# Patient Record
Sex: FEMALE | Race: WHITE | NOT HISPANIC OR LATINO | URBAN - METROPOLITAN AREA
[De-identification: names, ages, dates, MRNs, and addresses within clinical notes are randomized per-mention and may not be internally consistent; named-entity substitution may affect disease eponyms.]

---

## 2024-05-01 ENCOUNTER — HOSPITAL ENCOUNTER (INPATIENT)
Facility: HOSPITAL | Age: 69
LOS: 1 days | Discharge: HOME | DRG: 419 | End: 2024-05-03
Attending: EMERGENCY MEDICINE | Admitting: COLON & RECTAL SURGERY
Payer: MEDICARE

## 2024-05-01 DIAGNOSIS — R79.89 ELEVATED LIVER FUNCTION TESTS: ICD-10-CM

## 2024-05-01 DIAGNOSIS — K80.00 ACUTE CHOLECYSTITIS DUE TO BILIARY CALCULUS: Primary | ICD-10-CM

## 2024-05-01 DIAGNOSIS — K80.01 CALCULUS OF GALLBLADDER WITH ACUTE CHOLECYSTITIS AND OBSTRUCTION: ICD-10-CM

## 2024-05-01 LAB
ALBUMIN SERPL BCP-MCNC: 2.9 G/DL (ref 3.5–5.2)
ALP SERPL-CCNC: 326 U/L (ref 55–135)
ALT SERPL W/O P-5'-P-CCNC: 99 U/L (ref 10–44)
ANION GAP SERPL CALC-SCNC: 9 MMOL/L (ref 8–16)
AST SERPL-CCNC: 186 U/L (ref 10–40)
BASOPHILS # BLD AUTO: 0.05 K/UL (ref 0–0.2)
BASOPHILS NFR BLD: 0.5 % (ref 0–1.9)
BILIRUB SERPL-MCNC: 1.4 MG/DL (ref 0.1–1)
BUN SERPL-MCNC: 11 MG/DL (ref 8–23)
CALCIUM SERPL-MCNC: 9.1 MG/DL (ref 8.7–10.5)
CHLORIDE SERPL-SCNC: 101 MMOL/L (ref 95–110)
CO2 SERPL-SCNC: 23 MMOL/L (ref 23–29)
CREAT SERPL-MCNC: 0.7 MG/DL (ref 0.5–1.4)
DIFFERENTIAL METHOD BLD: ABNORMAL
EOSINOPHIL # BLD AUTO: 0.1 K/UL (ref 0–0.5)
EOSINOPHIL NFR BLD: 1 % (ref 0–8)
ERYTHROCYTE [DISTWIDTH] IN BLOOD BY AUTOMATED COUNT: 12.8 % (ref 11.5–14.5)
EST. GFR  (NO RACE VARIABLE): >60 ML/MIN/1.73 M^2
GLUCOSE SERPL-MCNC: 148 MG/DL (ref 70–110)
HCT VFR BLD AUTO: 40.7 % (ref 37–48.5)
HGB BLD-MCNC: 13.5 G/DL (ref 12–16)
IMM GRANULOCYTES # BLD AUTO: 0.07 K/UL (ref 0–0.04)
IMM GRANULOCYTES NFR BLD AUTO: 0.7 % (ref 0–0.5)
LIPASE SERPL-CCNC: 10 U/L (ref 4–60)
LYMPHOCYTES # BLD AUTO: 0.9 K/UL (ref 1–4.8)
LYMPHOCYTES NFR BLD: 8 % (ref 18–48)
MCH RBC QN AUTO: 30 PG (ref 27–31)
MCHC RBC AUTO-ENTMCNC: 33.2 G/DL (ref 32–36)
MCV RBC AUTO: 90 FL (ref 82–98)
MONOCYTES # BLD AUTO: 0.9 K/UL (ref 0.3–1)
MONOCYTES NFR BLD: 8.9 % (ref 4–15)
NEUTROPHILS # BLD AUTO: 8.6 K/UL (ref 1.8–7.7)
NEUTROPHILS NFR BLD: 80.9 % (ref 38–73)
NRBC BLD-RTO: 0 /100 WBC
PLATELET # BLD AUTO: 277 K/UL (ref 150–450)
PMV BLD AUTO: 9.1 FL (ref 9.2–12.9)
POTASSIUM SERPL-SCNC: 3.8 MMOL/L (ref 3.5–5.1)
PROT SERPL-MCNC: 6.6 G/DL (ref 6–8.4)
RBC # BLD AUTO: 4.5 M/UL (ref 4–5.4)
SODIUM SERPL-SCNC: 133 MMOL/L (ref 136–145)
WBC # BLD AUTO: 10.57 K/UL (ref 3.9–12.7)

## 2024-05-01 PROCEDURE — 96376 TX/PRO/DX INJ SAME DRUG ADON: CPT

## 2024-05-01 PROCEDURE — 85025 COMPLETE CBC W/AUTO DIFF WBC: CPT | Performed by: EMERGENCY MEDICINE

## 2024-05-01 PROCEDURE — 99285 EMERGENCY DEPT VISIT HI MDM: CPT | Mod: 25

## 2024-05-01 PROCEDURE — 36415 COLL VENOUS BLD VENIPUNCTURE: CPT | Performed by: EMERGENCY MEDICINE

## 2024-05-01 PROCEDURE — 96375 TX/PRO/DX INJ NEW DRUG ADDON: CPT

## 2024-05-01 PROCEDURE — 83690 ASSAY OF LIPASE: CPT | Performed by: EMERGENCY MEDICINE

## 2024-05-01 PROCEDURE — 63600175 PHARM REV CODE 636 W HCPCS: Performed by: EMERGENCY MEDICINE

## 2024-05-01 PROCEDURE — 80053 COMPREHEN METABOLIC PANEL: CPT | Performed by: EMERGENCY MEDICINE

## 2024-05-01 PROCEDURE — 96366 THER/PROPH/DIAG IV INF ADDON: CPT

## 2024-05-01 PROCEDURE — 25000003 PHARM REV CODE 250: Performed by: EMERGENCY MEDICINE

## 2024-05-01 PROCEDURE — 96365 THER/PROPH/DIAG IV INF INIT: CPT

## 2024-05-01 RX ORDER — SODIUM CHLORIDE, SODIUM LACTATE, POTASSIUM CHLORIDE, CALCIUM CHLORIDE 600; 310; 30; 20 MG/100ML; MG/100ML; MG/100ML; MG/100ML
INJECTION, SOLUTION INTRAVENOUS CONTINUOUS
Status: DISCONTINUED | OUTPATIENT
Start: 2024-05-02 | End: 2024-05-02

## 2024-05-01 RX ORDER — ACETAMINOPHEN 325 MG/1
650 TABLET ORAL EVERY 8 HOURS PRN
Status: DISCONTINUED | OUTPATIENT
Start: 2024-05-02 | End: 2024-05-03 | Stop reason: HOSPADM

## 2024-05-01 RX ORDER — ONDANSETRON HYDROCHLORIDE 2 MG/ML
4 INJECTION, SOLUTION INTRAVENOUS EVERY 8 HOURS PRN
Status: DISCONTINUED | OUTPATIENT
Start: 2024-05-02 | End: 2024-05-02

## 2024-05-01 RX ORDER — TALC
6 POWDER (GRAM) TOPICAL NIGHTLY PRN
Status: DISCONTINUED | OUTPATIENT
Start: 2024-05-02 | End: 2024-05-03 | Stop reason: HOSPADM

## 2024-05-01 RX ORDER — MORPHINE SULFATE 4 MG/ML
6 INJECTION, SOLUTION INTRAMUSCULAR; INTRAVENOUS EVERY 4 HOURS PRN
Status: DISCONTINUED | OUTPATIENT
Start: 2024-05-02 | End: 2024-05-02

## 2024-05-01 RX ORDER — MORPHINE SULFATE 4 MG/ML
6 INJECTION, SOLUTION INTRAMUSCULAR; INTRAVENOUS
Status: COMPLETED | OUTPATIENT
Start: 2024-05-01 | End: 2024-05-01

## 2024-05-01 RX ORDER — ONDANSETRON HYDROCHLORIDE 2 MG/ML
4 INJECTION, SOLUTION INTRAVENOUS
Status: COMPLETED | OUTPATIENT
Start: 2024-05-01 | End: 2024-05-01

## 2024-05-01 RX ORDER — MORPHINE SULFATE 4 MG/ML
4 INJECTION, SOLUTION INTRAMUSCULAR; INTRAVENOUS
Status: COMPLETED | OUTPATIENT
Start: 2024-05-01 | End: 2024-05-01

## 2024-05-01 RX ORDER — SODIUM CHLORIDE 0.9 % (FLUSH) 0.9 %
10 SYRINGE (ML) INJECTION
Status: DISCONTINUED | OUTPATIENT
Start: 2024-05-02 | End: 2024-05-03 | Stop reason: HOSPADM

## 2024-05-01 RX ORDER — SODIUM CHLORIDE, SODIUM LACTATE, POTASSIUM CHLORIDE, CALCIUM CHLORIDE 600; 310; 30; 20 MG/100ML; MG/100ML; MG/100ML; MG/100ML
1000 INJECTION, SOLUTION INTRAVENOUS
Status: COMPLETED | OUTPATIENT
Start: 2024-05-01 | End: 2024-05-01

## 2024-05-01 RX ORDER — LIDOCAINE HYDROCHLORIDE 10 MG/ML
1 INJECTION, SOLUTION EPIDURAL; INFILTRATION; INTRACAUDAL; PERINEURAL ONCE AS NEEDED
Status: DISCONTINUED | OUTPATIENT
Start: 2024-05-02 | End: 2024-05-03 | Stop reason: HOSPADM

## 2024-05-01 RX ADMIN — ONDANSETRON 4 MG: 2 INJECTION INTRAMUSCULAR; INTRAVENOUS at 06:05

## 2024-05-01 RX ADMIN — SODIUM CHLORIDE, SODIUM LACTATE, POTASSIUM CHLORIDE, AND CALCIUM CHLORIDE 1000 ML: 600; 310; 30; 20 INJECTION, SOLUTION INTRAVENOUS at 06:05

## 2024-05-01 RX ADMIN — PIPERACILLIN AND TAZOBACTAM 4.5 G: 4; .5 INJECTION, POWDER, LYOPHILIZED, FOR SOLUTION INTRAVENOUS; PARENTERAL at 08:05

## 2024-05-01 RX ADMIN — MORPHINE SULFATE 6 MG: 4 INJECTION INTRAVENOUS at 10:05

## 2024-05-01 RX ADMIN — MORPHINE SULFATE 4 MG: 4 INJECTION INTRAVENOUS at 06:05

## 2024-05-01 NOTE — ED PROVIDER NOTES
Endocrinology Note         Awilda is a 33 year old female presents today for Hashimoto's thyroiditis    HPI  Awilda is a 33 year old female presents today for Hashimoto's thyroiditis.    She was diagnosed with Hashimoto thyroiditis during her first pregnancy about 4 years ago.  She was initiated on levothyroxine during pregnancy and discontinued after the first pregnancy.  In 2017 before her second pregnancy, she was tested positive for TPO and antithyroglobulin antibody and TSH was 11.  She was then started on thyroid medication.  She stated she was started on both levothyroxine and liothyronine.  She does not know why she was started on both medication at the same time.    She moved to [place] in June 2018 and gave birth to her second child in January 2019.  She stated over the past 6 months she has been feeling more fatigue, sleeps a lot about 8 to 9 hours a day, abdominal bloating, inability to lose weight and brain fog.  She usually feels cold.she had regular menstrual period.  She stated she gained about 30 pounds since her first pregnancy and unable to lose.    Her baseline weight before pregnancy was 150 pound.  She stated her work is pretty active.  She exercise using elliptical about 1-3 times per week and yoga once a week.  Her diet is decent with a lot of fruits and some vegetable.  She usually cook at home.    Her current regimen include levothyroxine 88 mcg and liothyronine 5 mcg daily.    Past Medical History  Past Medical History:   Diagnosis Date     Asthma      Hashimoto's disease      Lichen sclerosus 11/2019     Uncomplicated asthma     Uses an inhaler as needed       Allergies  Allergies   Allergen Reactions     No Known Allergies      Medications  Current Outpatient Medications   Medication Sig Dispense Refill     Cholecalciferol (VITAMIN D-3 PO)        clobetasol (TEMOVATE) 0.05 % external cream Apply topically three times a week 32 g 3     levothyroxine (TIROSINT) 88 MCG capsule Take  SCRIBE #1 NOTE: I, Micheal Lopez, am scribing for, and in the presence of, Risa Hunter MD. I have scribed the HPI, ROS, and PE.     SCRIBE #2 NOTE: I, Goran Gilmore, am scribing for, and in the presence of,  Sirisha Andres MD. I have scribed the remaining portions of the note not scribed by Scribe #1.      History     Chief Complaint   Patient presents with    Abdominal Pain     RUQ abd pain radiating to the back. Hx gallstones. +n/v states this it the third episode since November      Review of patient's allergies indicates:   Allergen Reactions    Statins-hmg-coa reductase inhibitors Other (See Comments)     Severe muscular issues    Evolocumab     Calcium Itching    Codeine Nausea And Vomiting    Fish oil Rash         History of Present Illness     HPI    5/1/2024, 6:31 PM  History obtained from the patient      History of Present Illness: Apple Gonsalez is a 68 y.o. female patient with a PMHx of chronic constipation, mixed HLD, and osteopenia who presents to the Emergency Department for evaluation of progressively worsening RUQ pain which onset on Saturday. Pt notes that she has a hx of gallstones and that this feels similar to when she was hospitalized in November 2023. The pain radiates to her back and worsens after eating. Associated sxs include N/V. Patient denies any CP, SOB, HA, fever, cough, and all other sxs at this time. No prior Tx. She has not f/u with general surgery and is currently in the process of moving to [place] from [place]. No further complaints or concerns at this time.       Arrival mode: Personal vehicle      PCP: Ken Wall MD        Past Medical History:  Past Medical History:   Diagnosis Date    Chronic constipation 7/10/2020    Mixed hyperlipidemia 7/10/2020    Osteopenia of multiple sites 7/10/2020       Past Surgical History:  Past Surgical History:   Procedure Laterality Date    APPENDECTOMY      HYSTERECTOMY      TONSILLECTOMY           Family  "88 mcg by mouth daily 90 capsule 3     liothyronine (CYTOMEL) 5 MCG tablet Take 1 tablet (5 mcg) by mouth daily 90 tablet 3     Family History  family history includes Arthritis in her father and paternal grandmother; Asthma in her brother; Breast Cancer (age of onset: 40) in her mother; Diabetes in her maternal grandfather; Eye Disorder in her maternal grandmother; Gastrointestinal Disease in an other family member; Heart Disease in her paternal grandfather; Hyperlipidemia in her mother; Hypertension in her father; Osteoporosis in her paternal grandmother.   Her father may have thyroid problem but is not on medication  No thyroid cancer in the family    Social History  Social History     Tobacco Use     Smoking status: Never Smoker     Smokeless tobacco: Never Used   Substance Use Topics     Alcohol use: Yes     Comment: very infrequently     Drug use: No   Drink alcohol once a week  No smoking  , has 2 children aged 4 and 1 years old    ROS  Constitutional: no weight change, good energy  Eyes: no vision change, diplopia or red eyes   Neck: no difficulty swallowing, no choking, no neck pain, no neck swelling  Cardiovascular: no chest pain, palpitations  Respiratory: no dyspnea, cough, shortness of breath or wheezing   GI: no nausea, vomiting, diarrhea or constipation, no abdominal pain   : no change in urine, no dysuria or hematuria  Musculoskeletal: no joint or muscle pain or swelling   Integumentary: no concerning lesions or moles   Neuro: no loss of strength or sensation, no numbness or tingling, no tremor, no dizziness, no headache   Endo: no polyuria or polydipsia, no temperature intolerance   Heme/Lymph: no concerning bumps, no bleeding problems   Allergy: no environmental allergies   Psych: no depression or anxiety, no sleep problems.    Physical Exam  /74   Pulse 90   Ht 1.651 m (5' 5\")   Wt 81 kg (178 lb 8 oz)   LMP 12/21/2019   SpO2 98%   BMI 29.70 kg/m    Body mass index is 29.7 " History:  Family History   Problem Relation Name Age of Onset    Diabetes Father      Hypertension Father      Hyperlipidemia Maternal Grandfather         Social History:  Social History     Tobacco Use    Smoking status: Never    Smokeless tobacco: Never   Substance and Sexual Activity    Alcohol use: Yes     Comment: occasional    Drug use: Not on file    Sexual activity: Not on file        Review of Systems     Review of Systems   Constitutional:  Negative for fever.   HENT:  Negative for sore throat.    Respiratory:  Negative for cough and shortness of breath.    Cardiovascular:  Negative for chest pain.   Gastrointestinal:  Positive for abdominal pain (RUQ), nausea and vomiting.   Genitourinary:  Negative for dysuria.   Musculoskeletal:  Negative for back pain.   Skin:  Negative for rash.   Neurological:  Negative for weakness and headaches.   Hematological:  Does not bruise/bleed easily.   All other systems reviewed and are negative.     Physical Exam     Initial Vitals [05/01/24 1702]   BP Pulse Resp Temp SpO2   118/61 103 18 98.7 °F (37.1 °C) 99 %      MAP       --          Physical Exam  Nursing Notes and Vital Signs Reviewed.  Constitutional: Patient is in no acute distress. Well-developed and well-nourished.  Head: Atraumatic. Normocephalic.  Eyes: PERRL. EOM intact. Conjunctivae are not pale. No scleral icterus.  ENT: Mucous membranes are moist. Oropharynx is clear and symmetric.    Neck: Supple. Full ROM. No lymphadenopathy.  Cardiovascular: Regular rate. Regular rhythm. No murmurs, rubs, or gallops. Distal pulses are 2+ and symmetric.  Pulmonary/Chest: No respiratory distress. Clear to auscultation bilaterally. No wheezing or rales.  Abdominal: Soft and non-distended.  RUQ tenderness.(+) Carrera's sign.  Genitourinary: No CVA tenderness  Musculoskeletal: Moves all extremities. No obvious deformities. No edema. No calf tenderness.  Skin: Warm and dry.  Neurological:  Alert, awake, and appropriate.   "Normal speech.  No acute focal neurological deficits are appreciated.  Psychiatric: Normal affect. Good eye contact. Appropriate in content.     ED Course   Procedures  ED Vital Signs:  Vitals:    05/01/24 1702 05/01/24 1842 05/01/24 1845 05/01/24 2132   BP: 118/61 (!) 119/44  (!) 123/56   Pulse: 103 93  92   Resp: 18  16    Temp: 98.7 °F (37.1 °C)      TempSrc: Oral      SpO2: 99% 97%  96%   Weight: 62.5 kg (137 lb 12.6 oz)      Height:        05/01/24 2200 05/01/24 2202 05/02/24 0006 05/02/24 0103   BP:  (!) 115/57 (!) 101/50 104/60   Pulse:  90 87 76   Resp: 17  14 16   Temp:   99.8 °F (37.7 °C) 97.4 °F (36.3 °C)   TempSrc:   Axillary Oral   SpO2:  96% 96%    Weight:    61.2 kg (135 lb)   Height:    5' 3" (1.6 m)    05/02/24 0105 05/02/24 0107 05/02/24 0115   BP: 104/60 104/60 104/60   Pulse: 75 75    Resp: 16 16    Temp: 97.4 °F (36.3 °C) 97.4 °F (36.3 °C) 97.4 °F (36.3 °C)   TempSrc:  Oral    SpO2: 96% 96%    Weight: 61.2 kg (135 lb)     Height: 5' 3" (1.6 m)         Abnormal Lab Results:  Labs Reviewed   CBC W/ AUTO DIFFERENTIAL - Abnormal; Notable for the following components:       Result Value    MPV 9.1 (*)     Immature Granulocytes 0.7 (*)     Gran # (ANC) 8.6 (*)     Immature Grans (Abs) 0.07 (*)     Lymph # 0.9 (*)     Gran % 80.9 (*)     Lymph % 8.0 (*)     All other components within normal limits   COMPREHENSIVE METABOLIC PANEL - Abnormal; Notable for the following components:    Sodium 133 (*)     Glucose 148 (*)     Albumin 2.9 (*)     Total Bilirubin 1.4 (*)     Alkaline Phosphatase 326 (*)      (*)     ALT 99 (*)     All other components within normal limits    Narrative:     Collection has been rescheduled by ROR1 at 05/01/2024 21:20 Reason:   Unable to collect   LIPASE    Narrative:     Collection has been rescheduled by ROR1 at 05/01/2024 21:20 Reason:   Unable to collect        All Lab Results:  Results for orders placed or performed during the hospital encounter of 05/01/24   CBC auto " kg/m .  Constitutional: no distress, comfortable, pleasant   Eyes: anicteric, normal extra-ocular movements, no lid lag or retraction  Neck: no thyromegaly, no discrete nodule  Cardiovascular: regular rate and rhythm, normal S1 and S2, no murmurs  Respiratory: clear to auscultation, no wheezes or crackles, normal breath sounds   Gastrointestinal:  nontender, no hepatosplenomegaly, no masses   Musculoskeletal: no edema   Skin: no concerning lesions, no jaundice   Neurological: cranial nerves intact, normal strength and sensation, reflexes at patella, normal gait, no tremor on outstretched hands bilaterally  Psychological: appropriate mood   Lymphatic: no cervical  lymphadenopathy.      RESULTS  I have personally reviewed labs and images. I also reviewed labs with patient and discussed the result and plan of care.          ASSESSMENT:    Awilda is a 33 year old female presents today for Hashimoto's thyroiditis.    1) Hashimoto thyroiditis: Diagnosed 4 years ago.  She had positive TPO and antithyroglobulin antibody.  Her symptoms got worse over the past 6 months after she gave birth to her second child.  Her symptoms include abdominal bloating, fatigue, excessive sleepiness, and weight gain.  I am not quite sure whether her symptoms are related to thyroid disease or not.  I discussed with her on using only levothyroxine as a treatment of Hashimoto thyroiditis.  She is okay with this approach.  We will start with rechecking TSH, free T4, and total T3 to adjust dose accordingly.    2) abdominal bloating: This is a concerning.  No other gastrointestinal symptoms.  I will check tissue transglutaminase antibody to rule out celiac disease.  I advised her to try gluten-free diet.    PLAN:   -Check TSH, free T4, total T3 and tissue transglutaminase antibody today  -I will adjust dose accordingly    Addendum  Results for BIJAL HOLGUIN (MRN [id number]) as of 1/2/2020 16:55   Ref. Range 1/2/2020 08:53   T4 Free Latest Ref Range:  0.76 - 1.46 ng/dL 0.85   Triiodothyronine (T3) Latest Ref Range: 60 - 181 ng/dL 120   TSH Latest Ref Range: 0.40 - 4.00 mU/L 1.88     Will discontinue liothyronine  Will increase levothyroxine 100 mcg daily.  Repeat lab in 6-8 weeks    Moose Spears MD  Division of Diabetes and Endocrinology  Department of Medicine  [phone]     differential   Result Value Ref Range    WBC 10.57 3.90 - 12.70 K/uL    RBC 4.50 4.00 - 5.40 M/uL    Hemoglobin 13.5 12.0 - 16.0 g/dL    Hematocrit 40.7 37.0 - 48.5 %    MCV 90 82 - 98 fL    MCH 30.0 27.0 - 31.0 pg    MCHC 33.2 32.0 - 36.0 g/dL    RDW 12.8 11.5 - 14.5 %    Platelets 277 150 - 450 K/uL    MPV 9.1 (L) 9.2 - 12.9 fL    Immature Granulocytes 0.7 (H) 0.0 - 0.5 %    Gran # (ANC) 8.6 (H) 1.8 - 7.7 K/uL    Immature Grans (Abs) 0.07 (H) 0.00 - 0.04 K/uL    Lymph # 0.9 (L) 1.0 - 4.8 K/uL    Mono # 0.9 0.3 - 1.0 K/uL    Eos # 0.1 0.0 - 0.5 K/uL    Baso # 0.05 0.00 - 0.20 K/uL    nRBC 0 0 /100 WBC    Gran % 80.9 (H) 38.0 - 73.0 %    Lymph % 8.0 (L) 18.0 - 48.0 %    Mono % 8.9 4.0 - 15.0 %    Eosinophil % 1.0 0.0 - 8.0 %    Basophil % 0.5 0.0 - 1.9 %    Differential Method Automated    Comprehensive metabolic panel   Result Value Ref Range    Sodium 133 (L) 136 - 145 mmol/L    Potassium 3.8 3.5 - 5.1 mmol/L    Chloride 101 95 - 110 mmol/L    CO2 23 23 - 29 mmol/L    Glucose 148 (H) 70 - 110 mg/dL    BUN 11 8 - 23 mg/dL    Creatinine 0.7 0.5 - 1.4 mg/dL    Calcium 9.1 8.7 - 10.5 mg/dL    Total Protein 6.6 6.0 - 8.4 g/dL    Albumin 2.9 (L) 3.5 - 5.2 g/dL    Total Bilirubin 1.4 (H) 0.1 - 1.0 mg/dL    Alkaline Phosphatase 326 (H) 55 - 135 U/L     (H) 10 - 40 U/L    ALT 99 (H) 10 - 44 U/L    eGFR >60 >60 mL/min/1.73 m^2    Anion Gap 9 8 - 16 mmol/L   Lipase   Result Value Ref Range    Lipase 10 4 - 60 U/L        Imaging Results:  Imaging Results              US Abdomen Limited (Final result)  Result time 05/01/24 20:17:44      Final result by Barrett Lu MD (05/01/24 20:17:44)                   Impression:      Hemangiomas previously reported on prior exam not seen likely due to limited windows with midline bowel gas.    Thickened gallbladder wall measuring up to 4.3 mm.  Distended gallbladder. Small shadowing stone in the gallbladder neck. Positive sonographic Carrera's sign.  No common bile duct  dilatation.  A a correlate clinically for cholecystitis.      Electronically signed by: Barrett Aly  Date:    05/01/2024  Time:    20:17               Narrative:    EXAMINATION:  US ABDOMEN LIMITED    CLINICAL HISTORY:  RUQ Pain;    TECHNIQUE:  Limited ultrasound of the right upper quadrant of the abdomen (including pancreas, liver, gallbladder, common bile duct, and spleen) was performed.    COMPARISON:  Prior report    FINDINGS:  Liver: Hemangiomas previously reported on prior exam not seen likely due to limited windows with midline bowel gas.  Liver measures up to 15.2 cm.    Gallbladder: Thickened gallbladder wall measuring up to 4.3 mm.  Distended gallbladder.  Small shadowing stone in the gallbladder neck.  Positive sonographic Carrera's sign.    Biliary system: The common duct is not dilated, measuring 5 mm.  No intrahepatic ductal dilatation.    Right kidney: Normal in size and echotexture, measuring 9.1 cm.    Miscellaneous: No upper abdominal ascites.  Pancreas is obscured by bowel gas                                         The Emergency Provider reviewed the vital signs and test results, which are outlined above.     ED Discussion     8:00 PM: Dr. Hunter transfers care of patient to Dr. Andres pending imagin results.         Medical Decision Making  Amount and/or Complexity of Data Reviewed  External Data Reviewed: notes.     Details: Note from November 2023 reviewed. Shows that pt presented with similar sxs but also had a small bowel obstruction. Her sxs were treated, and she was discharged with instructions to f/u with general surgery to have an elective cholecystectomy.   Labs: ordered. Decision-making details documented in ED Course.  Radiology: ordered. Decision-making details documented in ED Course.  Discussion of management or test interpretation with external provider(s):     11:59 PM: Discussed case with Dr. Valdez (General Surgery) who reocmmends obs, NPO, IVF, pain and nausea  medication, and repeat CBC and CMP at 0600. Dr. Valdez agrees with current care and management of pt and accepts admission.   Admitting Service: General Surgery  Admitting Physician: Dr. Valdez  Admit to: Obs    11:59 PM: Re-evaluated pt. I have discussed test results, shared treatment plan, and the need for admission with patient and family at bedside. Pt and family express understanding at this time and agree with all information. All questions answered. Pt and family have no further questions or concerns at this time. Pt is ready for admit.      Risk  OTC drugs.  Prescription drug management.                ED Medication(s):  Medications   LIDOcaine (PF) 10 mg/ml (1%) injection 10 mg (has no administration in time range)   melatonin tablet 6 mg (has no administration in time range)   acetaminophen tablet 650 mg (has no administration in time range)   sodium chloride 0.9% flush 10 mL (has no administration in time range)   lactated ringers infusion ( Intravenous New Bag 5/2/24 0112)   morphine injection 6 mg (has no administration in time range)   ondansetron injection 4 mg (4 mg Intravenous Given 5/2/24 0045)   piperacillin-tazobactam (ZOSYN) 4.5 g in dextrose 5 % in water (D5W) 100 mL IVPB (MB+) (has no administration in time range)   ondansetron injection 4 mg (4 mg Intravenous Given 5/1/24 1845)   morphine injection 4 mg (4 mg Intravenous Given 5/1/24 1845)   lactated ringers infusion (1,000 mLs Intravenous New Bag 5/1/24 1845)   piperacillin-tazobactam (ZOSYN) 4.5 g in dextrose 5 % in water (D5W) 100 mL IVPB (MB+) (0 g Intravenous Stopped 5/1/24 2212)   morphine injection 6 mg (6 mg Intravenous Given 5/1/24 2200)       Current Discharge Medication List                  Scribe Attestation:   Scribe #1: I performed the above scribed service and the documentation accurately describes the services I performed. I attest to the accuracy of the note.     Attending:   Physician Attestation Statement for Scribe  #1: IDale Ashlyn, MD, personally performed the services described in this documentation, as scribed by Micheal Lopez, in my presence, and it is both accurate and complete.       Scribe Attestation:   Scribe #2: I performed the above scribed service and the documentation accurately describes the services I performed. I attest to the accuracy of the note.    Attending Attestation:           Physician Attestation for Scribe:    Physician Attestation Statement for Scribe #2: I, Sirisha Andres MD, reviewed documentation, as scribed by Goran Gilmore in my presence, and it is both accurate and complete. I also acknowledge and confirm the content of the note done by Scribe #1.           Clinical Impression       ICD-10-CM ICD-9-CM   1. Acute cholecystitis due to biliary calculus  K80.00 574.00       Disposition:   Disposition: Placed in Observation  Condition: Sirisha Hardy MD  05/02/24 2389

## 2024-05-02 ENCOUNTER — ANESTHESIA (OUTPATIENT)
Dept: SURGERY | Facility: HOSPITAL | Age: 69
DRG: 419 | End: 2024-05-02
Payer: MEDICARE

## 2024-05-02 ENCOUNTER — ANESTHESIA EVENT (OUTPATIENT)
Dept: SURGERY | Facility: HOSPITAL | Age: 69
DRG: 419 | End: 2024-05-02
Payer: MEDICARE

## 2024-05-02 PROBLEM — K80.01 CALCULUS OF GALLBLADDER WITH ACUTE CHOLECYSTITIS AND OBSTRUCTION: Status: ACTIVE | Noted: 2024-05-02

## 2024-05-02 PROBLEM — R79.89 ELEVATED LIVER FUNCTION TESTS: Status: ACTIVE | Noted: 2024-05-02

## 2024-05-02 LAB
ALBUMIN SERPL BCP-MCNC: 2.8 G/DL (ref 3.5–5.2)
ALP SERPL-CCNC: 334 U/L (ref 55–135)
ALT SERPL W/O P-5'-P-CCNC: 158 U/L (ref 10–44)
ANION GAP SERPL CALC-SCNC: 10 MMOL/L (ref 8–16)
AST SERPL-CCNC: 216 U/L (ref 10–40)
BASOPHILS # BLD AUTO: 0.03 K/UL (ref 0–0.2)
BASOPHILS NFR BLD: 0.3 % (ref 0–1.9)
BILIRUB SERPL-MCNC: 1.2 MG/DL (ref 0.1–1)
BUN SERPL-MCNC: 12 MG/DL (ref 8–23)
CALCIUM SERPL-MCNC: 8.9 MG/DL (ref 8.7–10.5)
CHLORIDE SERPL-SCNC: 102 MMOL/L (ref 95–110)
CO2 SERPL-SCNC: 23 MMOL/L (ref 23–29)
CREAT SERPL-MCNC: 0.7 MG/DL (ref 0.5–1.4)
DIFFERENTIAL METHOD BLD: ABNORMAL
EOSINOPHIL # BLD AUTO: 0 K/UL (ref 0–0.5)
EOSINOPHIL NFR BLD: 0 % (ref 0–8)
ERYTHROCYTE [DISTWIDTH] IN BLOOD BY AUTOMATED COUNT: 12.9 % (ref 11.5–14.5)
EST. GFR  (NO RACE VARIABLE): >60 ML/MIN/1.73 M^2
GLUCOSE SERPL-MCNC: 136 MG/DL (ref 70–110)
HCT VFR BLD AUTO: 33.7 % (ref 37–48.5)
HGB BLD-MCNC: 11.1 G/DL (ref 12–16)
IMM GRANULOCYTES # BLD AUTO: 0.06 K/UL (ref 0–0.04)
IMM GRANULOCYTES NFR BLD AUTO: 0.6 % (ref 0–0.5)
LYMPHOCYTES # BLD AUTO: 0.6 K/UL (ref 1–4.8)
LYMPHOCYTES NFR BLD: 6.1 % (ref 18–48)
MCH RBC QN AUTO: 29.8 PG (ref 27–31)
MCHC RBC AUTO-ENTMCNC: 32.9 G/DL (ref 32–36)
MCV RBC AUTO: 90 FL (ref 82–98)
MONOCYTES # BLD AUTO: 1.1 K/UL (ref 0.3–1)
MONOCYTES NFR BLD: 11.5 % (ref 4–15)
NEUTROPHILS # BLD AUTO: 8 K/UL (ref 1.8–7.7)
NEUTROPHILS NFR BLD: 81.5 % (ref 38–73)
NRBC BLD-RTO: 0 /100 WBC
PLATELET # BLD AUTO: 241 K/UL (ref 150–450)
PMV BLD AUTO: 9.4 FL (ref 9.2–12.9)
POTASSIUM SERPL-SCNC: 4.3 MMOL/L (ref 3.5–5.1)
PROT SERPL-MCNC: 5.8 G/DL (ref 6–8.4)
RBC # BLD AUTO: 3.73 M/UL (ref 4–5.4)
SODIUM SERPL-SCNC: 135 MMOL/L (ref 136–145)
WBC # BLD AUTO: 9.79 K/UL (ref 3.9–12.7)

## 2024-05-02 PROCEDURE — 47562 LAPAROSCOPIC CHOLECYSTECTOMY: CPT | Mod: 22,,, | Performed by: COLON & RECTAL SURGERY

## 2024-05-02 PROCEDURE — 27201423 OPTIME MED/SURG SUP & DEVICES STERILE SUPPLY: Performed by: COLON & RECTAL SURGERY

## 2024-05-02 PROCEDURE — 25000003 PHARM REV CODE 250: Performed by: NURSE ANESTHETIST, CERTIFIED REGISTERED

## 2024-05-02 PROCEDURE — 63600175 PHARM REV CODE 636 W HCPCS: Mod: JZ,JG | Performed by: ANESTHESIOLOGY

## 2024-05-02 PROCEDURE — 88304 TISSUE EXAM BY PATHOLOGIST: CPT | Performed by: PATHOLOGY

## 2024-05-02 PROCEDURE — 96366 THER/PROPH/DIAG IV INF ADDON: CPT

## 2024-05-02 PROCEDURE — 63600175 PHARM REV CODE 636 W HCPCS: Performed by: NURSE ANESTHETIST, CERTIFIED REGISTERED

## 2024-05-02 PROCEDURE — 8E0W4CZ ROBOTIC ASSISTED PROCEDURE OF TRUNK REGION, PERCUTANEOUS ENDOSCOPIC APPROACH: ICD-10-PCS | Performed by: COLON & RECTAL SURGERY

## 2024-05-02 PROCEDURE — 0FT44ZZ RESECTION OF GALLBLADDER, PERCUTANEOUS ENDOSCOPIC APPROACH: ICD-10-PCS | Performed by: COLON & RECTAL SURGERY

## 2024-05-02 PROCEDURE — 63600175 PHARM REV CODE 636 W HCPCS

## 2024-05-02 PROCEDURE — 71000033 HC RECOVERY, INTIAL HOUR: Performed by: COLON & RECTAL SURGERY

## 2024-05-02 PROCEDURE — 88304 TISSUE EXAM BY PATHOLOGIST: CPT | Mod: 26,,, | Performed by: PATHOLOGY

## 2024-05-02 PROCEDURE — 11000001 HC ACUTE MED/SURG PRIVATE ROOM

## 2024-05-02 PROCEDURE — 63600175 PHARM REV CODE 636 W HCPCS: Performed by: STUDENT IN AN ORGANIZED HEALTH CARE EDUCATION/TRAINING PROGRAM

## 2024-05-02 PROCEDURE — 85025 COMPLETE CBC W/AUTO DIFF WBC: CPT | Performed by: EMERGENCY MEDICINE

## 2024-05-02 PROCEDURE — 47562 LAPAROSCOPIC CHOLECYSTECTOMY: CPT | Mod: AS,,,

## 2024-05-02 PROCEDURE — 25000003 PHARM REV CODE 250: Performed by: EMERGENCY MEDICINE

## 2024-05-02 PROCEDURE — 37000008 HC ANESTHESIA 1ST 15 MINUTES: Performed by: COLON & RECTAL SURGERY

## 2024-05-02 PROCEDURE — 99223 1ST HOSP IP/OBS HIGH 75: CPT | Mod: AI,,, | Performed by: SURGERY

## 2024-05-02 PROCEDURE — 63600175 PHARM REV CODE 636 W HCPCS: Performed by: EMERGENCY MEDICINE

## 2024-05-02 PROCEDURE — 36000710: Performed by: COLON & RECTAL SURGERY

## 2024-05-02 PROCEDURE — 36415 COLL VENOUS BLD VENIPUNCTURE: CPT | Performed by: EMERGENCY MEDICINE

## 2024-05-02 PROCEDURE — 96361 HYDRATE IV INFUSION ADD-ON: CPT

## 2024-05-02 PROCEDURE — 99900035 HC TECH TIME PER 15 MIN (STAT)

## 2024-05-02 PROCEDURE — 71000039 HC RECOVERY, EACH ADD'L HOUR: Performed by: COLON & RECTAL SURGERY

## 2024-05-02 PROCEDURE — 80053 COMPREHEN METABOLIC PANEL: CPT | Performed by: EMERGENCY MEDICINE

## 2024-05-02 PROCEDURE — 36000711: Performed by: COLON & RECTAL SURGERY

## 2024-05-02 PROCEDURE — 37000009 HC ANESTHESIA EA ADD 15 MINS: Performed by: COLON & RECTAL SURGERY

## 2024-05-02 PROCEDURE — 25000003 PHARM REV CODE 250

## 2024-05-02 PROCEDURE — 63600175 PHARM REV CODE 636 W HCPCS: Mod: JZ,JG | Performed by: COLON & RECTAL SURGERY

## 2024-05-02 PROCEDURE — 96376 TX/PRO/DX INJ SAME DRUG ADON: CPT

## 2024-05-02 RX ORDER — HYDROCODONE BITARTRATE AND ACETAMINOPHEN 5; 325 MG/1; MG/1
1 TABLET ORAL EVERY 4 HOURS PRN
Status: DISCONTINUED | OUTPATIENT
Start: 2024-05-02 | End: 2024-05-02

## 2024-05-02 RX ORDER — MIDAZOLAM HYDROCHLORIDE 1 MG/ML
INJECTION INTRAMUSCULAR; INTRAVENOUS
Status: DISCONTINUED | OUTPATIENT
Start: 2024-05-02 | End: 2024-05-02

## 2024-05-02 RX ORDER — SUCCINYLCHOLINE CHLORIDE 20 MG/ML
INJECTION INTRAMUSCULAR; INTRAVENOUS
Status: DISCONTINUED | OUTPATIENT
Start: 2024-05-02 | End: 2024-05-02

## 2024-05-02 RX ORDER — FENTANYL CITRATE 50 UG/ML
INJECTION, SOLUTION INTRAMUSCULAR; INTRAVENOUS
Status: DISCONTINUED | OUTPATIENT
Start: 2024-05-02 | End: 2024-05-02

## 2024-05-02 RX ORDER — MORPHINE SULFATE 4 MG/ML
6 INJECTION, SOLUTION INTRAMUSCULAR; INTRAVENOUS EVERY 4 HOURS PRN
Status: DISCONTINUED | OUTPATIENT
Start: 2024-05-02 | End: 2024-05-02

## 2024-05-02 RX ORDER — OXYCODONE AND ACETAMINOPHEN 5; 325 MG/1; MG/1
1 TABLET ORAL
Status: DISCONTINUED | OUTPATIENT
Start: 2024-05-02 | End: 2024-05-02 | Stop reason: HOSPADM

## 2024-05-02 RX ORDER — ROCURONIUM BROMIDE 10 MG/ML
INJECTION, SOLUTION INTRAVENOUS
Status: DISCONTINUED | OUTPATIENT
Start: 2024-05-02 | End: 2024-05-02

## 2024-05-02 RX ORDER — ONDANSETRON HYDROCHLORIDE 2 MG/ML
INJECTION, SOLUTION INTRAVENOUS
Status: DISCONTINUED | OUTPATIENT
Start: 2024-05-02 | End: 2024-05-02

## 2024-05-02 RX ORDER — PROPOFOL 10 MG/ML
VIAL (ML) INTRAVENOUS
Status: DISCONTINUED | OUTPATIENT
Start: 2024-05-02 | End: 2024-05-02

## 2024-05-02 RX ORDER — ACETAMINOPHEN 10 MG/ML
1000 INJECTION, SOLUTION INTRAVENOUS ONCE
Status: COMPLETED | OUTPATIENT
Start: 2024-05-02 | End: 2024-05-02

## 2024-05-02 RX ORDER — PHENYLEPHRINE HYDROCHLORIDE 10 MG/ML
INJECTION INTRAVENOUS
Status: DISCONTINUED | OUTPATIENT
Start: 2024-05-02 | End: 2024-05-02

## 2024-05-02 RX ORDER — ACETAMINOPHEN 325 MG/1
650 TABLET ORAL EVERY 6 HOURS
Status: DISCONTINUED | OUTPATIENT
Start: 2024-05-02 | End: 2024-05-03 | Stop reason: HOSPADM

## 2024-05-02 RX ORDER — LIDOCAINE HYDROCHLORIDE 20 MG/ML
INJECTION INTRAVENOUS
Status: DISCONTINUED | OUTPATIENT
Start: 2024-05-02 | End: 2024-05-02

## 2024-05-02 RX ORDER — CHLORHEXIDINE GLUCONATE ORAL RINSE 1.2 MG/ML
10 SOLUTION DENTAL 2 TIMES DAILY
Status: DISCONTINUED | OUTPATIENT
Start: 2024-05-02 | End: 2024-05-03 | Stop reason: HOSPADM

## 2024-05-02 RX ORDER — ONDANSETRON HYDROCHLORIDE 2 MG/ML
4 INJECTION, SOLUTION INTRAVENOUS EVERY 6 HOURS PRN
Status: DISCONTINUED | OUTPATIENT
Start: 2024-05-02 | End: 2024-05-03 | Stop reason: HOSPADM

## 2024-05-02 RX ORDER — OXYCODONE HYDROCHLORIDE 5 MG/1
10 TABLET ORAL EVERY 4 HOURS PRN
Status: DISCONTINUED | OUTPATIENT
Start: 2024-05-02 | End: 2024-05-03 | Stop reason: HOSPADM

## 2024-05-02 RX ORDER — INDOCYANINE GREEN AND WATER 25 MG
KIT INJECTION
Status: DISCONTINUED | OUTPATIENT
Start: 2024-05-02 | End: 2024-05-02

## 2024-05-02 RX ORDER — SODIUM CHLORIDE, SODIUM LACTATE, POTASSIUM CHLORIDE, CALCIUM CHLORIDE 600; 310; 30; 20 MG/100ML; MG/100ML; MG/100ML; MG/100ML
INJECTION, SOLUTION INTRAVENOUS CONTINUOUS
Status: DISCONTINUED | OUTPATIENT
Start: 2024-05-02 | End: 2024-05-03 | Stop reason: HOSPADM

## 2024-05-02 RX ORDER — DEXAMETHASONE SODIUM PHOSPHATE 4 MG/ML
INJECTION, SOLUTION INTRA-ARTICULAR; INTRALESIONAL; INTRAMUSCULAR; INTRAVENOUS; SOFT TISSUE
Status: DISCONTINUED | OUTPATIENT
Start: 2024-05-02 | End: 2024-05-02

## 2024-05-02 RX ORDER — DIPHENHYDRAMINE HYDROCHLORIDE 50 MG/ML
12.5 INJECTION INTRAMUSCULAR; INTRAVENOUS EVERY 6 HOURS PRN
Status: DISCONTINUED | OUTPATIENT
Start: 2024-05-02 | End: 2024-05-03 | Stop reason: HOSPADM

## 2024-05-02 RX ORDER — BUPIVACAINE HYDROCHLORIDE 2.5 MG/ML
INJECTION, SOLUTION EPIDURAL; INFILTRATION; INTRACAUDAL
Status: DISCONTINUED | OUTPATIENT
Start: 2024-05-02 | End: 2024-05-02 | Stop reason: HOSPADM

## 2024-05-02 RX ORDER — OXYCODONE HYDROCHLORIDE 5 MG/1
5 TABLET ORAL EVERY 4 HOURS PRN
Status: DISCONTINUED | OUTPATIENT
Start: 2024-05-02 | End: 2024-05-03 | Stop reason: HOSPADM

## 2024-05-02 RX ORDER — HYDROCODONE BITARTRATE AND ACETAMINOPHEN 7.5; 325 MG/1; MG/1
1 TABLET ORAL EVERY 6 HOURS PRN
Status: DISCONTINUED | OUTPATIENT
Start: 2024-05-02 | End: 2024-05-02

## 2024-05-02 RX ORDER — HYDROMORPHONE HYDROCHLORIDE 2 MG/ML
0.2 INJECTION, SOLUTION INTRAMUSCULAR; INTRAVENOUS; SUBCUTANEOUS EVERY 5 MIN PRN
Status: DISCONTINUED | OUTPATIENT
Start: 2024-05-02 | End: 2024-05-02 | Stop reason: HOSPADM

## 2024-05-02 RX ORDER — ONDANSETRON HYDROCHLORIDE 2 MG/ML
4 INJECTION, SOLUTION INTRAVENOUS DAILY PRN
Status: DISCONTINUED | OUTPATIENT
Start: 2024-05-02 | End: 2024-05-02 | Stop reason: HOSPADM

## 2024-05-02 RX ADMIN — SODIUM CHLORIDE, POTASSIUM CHLORIDE, SODIUM LACTATE AND CALCIUM CHLORIDE: 600; 310; 30; 20 INJECTION, SOLUTION INTRAVENOUS at 01:05

## 2024-05-02 RX ADMIN — ROCURONIUM BROMIDE 10 MG: 10 INJECTION, SOLUTION INTRAVENOUS at 11:05

## 2024-05-02 RX ADMIN — CHLORHEXIDINE GLUCONATE 0.12% ORAL RINSE 10 ML: 1.2 LIQUID ORAL at 08:05

## 2024-05-02 RX ADMIN — DEXAMETHASONE SODIUM PHOSPHATE 8 MG: 4 INJECTION, SOLUTION INTRA-ARTICULAR; INTRALESIONAL; INTRAMUSCULAR; INTRAVENOUS; SOFT TISSUE at 09:05

## 2024-05-02 RX ADMIN — ROCURONIUM BROMIDE 35 MG: 10 INJECTION, SOLUTION INTRAVENOUS at 09:05

## 2024-05-02 RX ADMIN — HYDROMORPHONE HYDROCHLORIDE 0.2 MG: 2 INJECTION, SOLUTION INTRAMUSCULAR; INTRAVENOUS; SUBCUTANEOUS at 12:05

## 2024-05-02 RX ADMIN — PIPERACILLIN AND TAZOBACTAM 4.5 G: 4; .5 INJECTION, POWDER, LYOPHILIZED, FOR SOLUTION INTRAVENOUS; PARENTERAL at 05:05

## 2024-05-02 RX ADMIN — ONDANSETRON 4 MG: 2 INJECTION INTRAMUSCULAR; INTRAVENOUS at 04:05

## 2024-05-02 RX ADMIN — MIDAZOLAM HYDROCHLORIDE 2 MG: 1 INJECTION, SOLUTION INTRAMUSCULAR; INTRAVENOUS at 09:05

## 2024-05-02 RX ADMIN — SODIUM CHLORIDE, SODIUM LACTATE, POTASSIUM CHLORIDE, AND CALCIUM CHLORIDE: .6; .31; .03; .02 INJECTION, SOLUTION INTRAVENOUS at 09:05

## 2024-05-02 RX ADMIN — FENTANYL CITRATE 50 MCG: 50 INJECTION, SOLUTION INTRAMUSCULAR; INTRAVENOUS at 09:05

## 2024-05-02 RX ADMIN — PROPOFOL 140 MG: 10 INJECTION, EMULSION INTRAVENOUS at 09:05

## 2024-05-02 RX ADMIN — ROCURONIUM BROMIDE 5 MG: 10 INJECTION, SOLUTION INTRAVENOUS at 09:05

## 2024-05-02 RX ADMIN — INDOCYANINE GREEN AND WATER 2.5 MG: KIT at 09:05

## 2024-05-02 RX ADMIN — PIPERACILLIN AND TAZOBACTAM 4.5 G: 4; .5 INJECTION, POWDER, LYOPHILIZED, FOR SOLUTION INTRAVENOUS; PARENTERAL at 10:05

## 2024-05-02 RX ADMIN — SODIUM CHLORIDE, SODIUM LACTATE, POTASSIUM CHLORIDE, AND CALCIUM CHLORIDE: .6; .31; .03; .02 INJECTION, SOLUTION INTRAVENOUS at 11:05

## 2024-05-02 RX ADMIN — OXYCODONE 10 MG: 5 TABLET ORAL at 04:05

## 2024-05-02 RX ADMIN — ACETAMINOPHEN 1000 MG: 10 INJECTION INTRAVENOUS at 12:05

## 2024-05-02 RX ADMIN — ROCURONIUM BROMIDE 10 MG: 10 INJECTION, SOLUTION INTRAVENOUS at 09:05

## 2024-05-02 RX ADMIN — ONDANSETRON 4 MG: 2 INJECTION INTRAMUSCULAR; INTRAVENOUS at 10:05

## 2024-05-02 RX ADMIN — SUCCINYLCHOLINE CHLORIDE 100 MG: 20 INJECTION, SOLUTION INTRAMUSCULAR; INTRAVENOUS; PARENTERAL at 09:05

## 2024-05-02 RX ADMIN — LIDOCAINE HYDROCHLORIDE 50 MG: 20 INJECTION INTRAVENOUS at 09:05

## 2024-05-02 RX ADMIN — FENTANYL CITRATE 25 MCG: 50 INJECTION, SOLUTION INTRAMUSCULAR; INTRAVENOUS at 10:05

## 2024-05-02 RX ADMIN — ONDANSETRON 4 MG: 2 INJECTION INTRAMUSCULAR; INTRAVENOUS at 12:05

## 2024-05-02 RX ADMIN — PHENYLEPHRINE HYDROCHLORIDE 200 MCG: 10 INJECTION INTRAVENOUS at 11:05

## 2024-05-02 RX ADMIN — SODIUM CHLORIDE, POTASSIUM CHLORIDE, SODIUM LACTATE AND CALCIUM CHLORIDE: 600; 310; 30; 20 INJECTION, SOLUTION INTRAVENOUS at 08:05

## 2024-05-02 RX ADMIN — ROCURONIUM BROMIDE 10 MG: 10 INJECTION, SOLUTION INTRAVENOUS at 10:05

## 2024-05-02 RX ADMIN — ONDANSETRON 8 MG: 2 INJECTION INTRAMUSCULAR; INTRAVENOUS at 09:05

## 2024-05-02 RX ADMIN — SODIUM CHLORIDE, POTASSIUM CHLORIDE, SODIUM LACTATE AND CALCIUM CHLORIDE: 600; 310; 30; 20 INJECTION, SOLUTION INTRAVENOUS at 05:05

## 2024-05-02 RX ADMIN — FENTANYL CITRATE 25 MCG: 50 INJECTION, SOLUTION INTRAMUSCULAR; INTRAVENOUS at 11:05

## 2024-05-02 RX ADMIN — SUGAMMADEX 200 MG: 100 INJECTION, SOLUTION INTRAVENOUS at 11:05

## 2024-05-02 NOTE — TRANSFER OF CARE
"Anesthesia Transfer of Care Note    Patient: Aplpe King    Procedure(s) Performed: Procedure(s) (LRB):  XI ROBOTIC CHOLECYSTECTOMY (N/A)  BLOCK, TRANSVERSUS ABDOMINIS PLANE (N/A)    Patient location: PACU    Anesthesia Type: general    Transport from OR: Transported from OR on room air with adequate spontaneous ventilation    Post pain: adequate analgesia    Post assessment: no apparent anesthetic complications    Post vital signs: stable    Level of consciousness: responds to stimulation    Nausea/Vomiting: no nausea/vomiting    Complications: none    Transfer of care protocol was followed      Last vitals: Visit Vitals  BP (!) 105/56 (Patient Position: Lying)   Pulse 74   Temp 36.7 °C (98 °F) (Oral)   Resp 18   Ht 5' 3" (1.6 m)   Wt 61.2 kg (135 lb)   SpO2 96%   Breastfeeding No   BMI 23.91 kg/m²     "

## 2024-05-02 NOTE — ANESTHESIA PREPROCEDURE EVALUATION
05/02/2024  Apple Gonsalez is a 68 y.o., female    Patient Active Problem List   Diagnosis    Osteopenia of multiple sites    Mixed hyperlipidemia    Chronic constipation    Calculus of gallbladder with acute cholecystitis and obstruction    Elevated liver function tests     Past Medical History:   Diagnosis Date    Chronic constipation 7/10/2020    Mixed hyperlipidemia 7/10/2020    Osteopenia of multiple sites 7/10/2020     Past Surgical History:   Procedure Laterality Date    APPENDECTOMY      HYSTERECTOMY      TONSILLECTOMY         Chemistry        Component Value Date/Time     (L) 05/02/2024 0505    K 4.3 05/02/2024 0505     05/02/2024 0505    CO2 23 05/02/2024 0505    BUN 12 05/02/2024 0505    CREATININE 0.7 05/02/2024 0505     (H) 05/02/2024 0505        Component Value Date/Time    CALCIUM 8.9 05/02/2024 0505    ALKPHOS 334 (H) 05/02/2024 0505     (H) 05/02/2024 0505     (H) 05/02/2024 0505    BILITOT 1.2 (H) 05/02/2024 0505        Lab Results   Component Value Date    WBC 9.79 05/02/2024    HGB 11.1 (L) 05/02/2024    HCT 33.7 (L) 05/02/2024    MCV 90 05/02/2024     05/02/2024       Pre-op Assessment    I have reviewed the Patient Summary Reports.    I have reviewed the NPO Status.   I have reviewed the Medications.     Review of Systems  Anesthesia Hx:  No problems with previous Anesthesia  Zofran works well for her PONV History of prior surgery of interest to airway management or planning:  Previous anesthesia: General         Personal Hx of Anesthesia complications, Post-Operative Nausea/Vomiting, in the past, but not with recent anesthetics / prophylaxis                    Social:  Non-Smoker       Hematology/Oncology:    Oncology Normal    -- Anemia:               Hematology Comments: 11.1/33.7                    Cardiovascular:  Cardiovascular Normal                                             Pulmonary:  Pulmonary Normal                       Renal/:  Renal/ Normal                 Hepatic/GI:        Elevated LFTs    Calculus of Gallbladder           Musculoskeletal:  Musculoskeletal Normal                Neurological:  Neurology Normal                                      Endocrine:  Endocrine Normal    BMI 24      Denies Obesity / BMI > 30      Physical Exam  General: Well nourished, Cooperative, Alert and Oriented    Airway:  Mallampati: III   Mouth Opening: Normal  TM Distance: Normal  Tongue: Normal  Neck ROM: Normal ROM    Dental:  Intact  Patient denies any currently loose or chipped teeth; Patient denies any removable dental appliances        Anesthesia Plan  Type of Anesthesia, risks & benefits discussed:    Anesthesia Type: Gen ETT  Intra-op Monitoring Plan: Standard ASA Monitors  Post Op Pain Control Plan: multimodal analgesia and IV/PO Opioids PRN  Induction:  IV  Airway Plan: Direct, Post-Induction  Informed Consent: Informed consent signed with the Patient and all parties understand the risks and agree with anesthesia plan.  All questions answered.   ASA Score: 2  Day of Surgery Review of History & Physical: H&P Update referred to the surgeon/provider.    Ready For Surgery From Anesthesia Perspective.     .

## 2024-05-02 NOTE — OP NOTE
[facility] Surg  Surgery Department  Operative Note    SUMMARY     Date of Procedure: 5/2/2024     Procedure:  Robotic cholecystectomy (mod 22)  Transversus abdominis plane block    Surgeons and Role:     * Jordy Ramos MD - Primary    Assistant: BRIAN Moody    Pre-Operative Diagnosis: Acute cholecystitis due to biliary calculus [K80.00]    Post-Operative Diagnosis: Post-Op Diagnosis Codes:     * Acute cholecystitis due to biliary calculus [K80.00]    Anesthesia: General    Technical Procedures Used:   Robotic cholecystectomy (mod 22)  Transversus abdominis plane block    Indications for Procedure: 67yo F with concerns for acute cholecystitis who presents for definitive surgical management    Findings of the Procedure:  Significantly inflamed gallbladder with evidence of chronic cholecystitis as well adherent to the duodenum and common bile duct requiring extensive dissection    Description of the Procedure:    *modifier 22 should be used for this case due to the difficult nature of dissection from the extremely thickened gallbladder requiring a top-down dissection*    Patient was brought to the operating room placed supine on table.  General endotracheal anesthesia was then induced.  The abdomen was then prepped and draped usual sterile fashion.  A preoperative surgical time-out was performed confirming the correct patient procedure and preop medications given.  A 5 mm left upper quadrant incision was made beneath the subcostal margin and the Veress needle inserted into abdominal cavity and confirmed good position aspiration and saline drop test.  The abdomen is insufflated to pressure 15 mm of mercury.  A 5 mm trocar was inserted this defect using Optiview technique.  The camera was introduced and there was no signs of injury upon entry.  The abdomen was evaluated and found to have adhesions of the omentum to the anterior abdominal wall mostly located in the lower abdomen.  A transversus abdominis  plane block was then performed using 30 cc of 0.25% bupivacaine plain by injecting 15 cc bilaterally into the transversus abdominis plane.    A supraumbilical 8 mm trocar was then inserted under direct visualization.  This trocar was then used to sweep down the omentum to allow for additional port placements with 2 on the right and 1 on the left in usual fashion.  The gallbladder was then identified laparoscopically and found to be extremely tense and adherent to the underlying omentum, transverse colon.  Once this was peeled off safely the gallbladder could not be retracted due to the tense nature and a decompression needle was used to decompress 50 cc of bilious fluid from the gallbladder.    The robot was then docked in usual fashion.  The gallbladder was then retracted cephalad above the liver to expose the infundibulum.  The infundibulum was found to be extremely adherent to the cystic duct as well as the common duct and the duodenum.  Careful dissection was then undertaken to attempt to identify the cystic duct and cystic artery.  However given the difficult nature of dissection in this area, decision was made to peel the gallbladder off of the gallbladder fossa 1st to safely identify the structures.  The peritoneum was then incised along the liver edge on both sides of the gallbladder and a thickened rind was left on the posterior wall of the gallbladder fossa.  The gallbladder was then dissected off using blunt and sharp dissection without injury to underlying structures.  During this process of retracting the gallbladder, a small rent was made in the gallbladder a small amount of stones and bile was spilled but was immediately suctioned.    Once the gallbladder was fully dissected off of its attachments to the gallbladder fossa, the infundibulum and the neck of the gallbladder identified and the cystic duct was then circumferentially dissected as well as the cystic artery.  The critical view of safety was  then obtained in usual fashion.  IC green had been given by anesthesia and it was confirmed that the area of the common duct was away from the area of dissection using firefly technology.  The critical view of safety was then re-obtained.  The cystic duct was extremely thickened with a large rind around it as well.  The cystic artery was then taken between large clips using electrocautery.  Two clips were placed away from the gallbladder and 1 towards the gallbladder.  Once that was taken was confirmed there was only 1 lumen and there was no extravasation of blood or bile.  The left most lateral port was then upsized to a 12 mm port.  The cystic duct was then divided using a robotic 45 mm green load stapler given the thickness of the duct.  Once it was fired the gallbladder and the ducts were completely free.  The final staple line was then oversewn with a 3-0 V lock suture in usual fashion.  The area was then checked and found to be completely hemostatic.    The gallbladder was then placed within Endo-Catch bag along with the spilled stones.  The area was then copiously irrigated and found to be hemostatic in all locations.  The robot was then de docked.  The left most lateral 12 mm port was then removed along with the Endo-Catch bag with the gallbladder and stones within it and passed off the field for final pathology.  The extraction site was then closed at the fascial level using a Job-Ruth device with 0 Vicryl suture in figure-of-eight fashion.  Once this was completed the abdomen was then checked laparoscopically and found to be hemostatic.  The abdomen was then desufflated the remaining ports were removed.  All sites were then closed with 4-0 Monocryl suture.  Skin glue was then applied.  The patient was then awoken from general endotracheal anesthesia and taken to the postanesthesia care in stable condition.  She tolerated procedure well.  All sponge, needle and instrument counts were correct at the  end of the case.    Significant Surgical Tasks Conducted by the Assistant(s), if Applicable: Assisted with all portions of the operation as it was required to help with the positioning, exposure and closure to complete the operation    Complications: No    Estimated Blood Loss (EBL): 25 mL           Implants: * No implants in log *    Specimens:   Specimen (24h ago, onward)       Start     Ordered    05/02/24 1120  Specimen to Pathology, Surgery General Surgery  Once        Comments: Pre-op Diagnosis: Acute cholecystitis due to biliary calculus [K80.00]Procedure(s):XI ROBOTIC CHOLECYSTECTOMY Number of specimens: 1Name of specimens: Gallbladder with stones (perm)     References:    Click here for ordering Quick Tip   Question Answer Comment   Procedure Type: General Surgery    Specimen Class: Routine/Screening    Release to patient Immediate        05/02/24 1120    Pending  Specimen to Pathology, Surgery General Surgery  Once        Comments: Pre-op Diagnosis: Acute cholecystitis due to biliary calculus [K80.00]Procedure(s):XI ROBOTIC CHOLECYSTECTOMY Number of specimens: 1Name of specimens: 1.  Gallbladder with stones--perm.     References:    Click here for ordering Quick Tip   Question Answer Comment   Procedure Type: General Surgery    Specimen Class: Routine/Screening    Release to patient Immediate        Pending                            Condition: Good    Disposition: PACU - hemodynamically stable.    Attestation: I performed the procedure.

## 2024-05-02 NOTE — PHARMACY MED REC
"Admission Medication History     The home medication history was taken by Suzette Ennis.    You may go to "Admission" then "Reconcile Home Medications" tabs to review and/or act upon these items.     The home medication list has been updated by the Pharmacy department.   Please read ALL comments highlighted in yellow.   Please address this information as you see fit.    Feel free to contact us if you have any questions or require assistance.      The medications listed below were removed from the home medication list. Please reorder if appropriate:  Patient reports no longer taking the following medication(s):  Asprin 81 mg  Calcium carbonate 1250 mg      Medications listed below were obtained from: Patient/family and Analytic software- [employer]      LAST [facility] COMPLETED:     Potential issues to be addressed PRIOR TO DISCHARGE  Please discuss with the patient barriers to adherence with medication treatment plans (ergocalciferol 50,000) patient has been taking this medication everyday instead of once a week    Suzettenguyen Ennis  -[phone]        Current Outpatient Medications on File Prior to Encounter   Medication Sig Dispense Refill Last Dose    ergocalciferol (ERGOCALCIFEROL) 50,000 unit Cap Take 50,000 Units by mouth every 7 days.   4/30/2024    magnesium 30 mg Tab Take 1 tablet by mouth once daily.   4/30/2024                             .          "

## 2024-05-02 NOTE — ASSESSMENT & PLAN NOTE
Patient has acute cholecystitis with elevated liver function tests.      Elevated liver function tests could be reactive or related to choledocholithiasis   Placed in observation  IV antibiotics   MRCP   Possible robotic assisted cholecystectomy by 1 of my partners.      If the MRCP shows choledocholithiasis then GI consult ERCP.  It was explained that if she needs an ERCP then she would need to go to [facility] or [facility]    The risks, benefits and complications of robotic/laparoscopic cholecystectomy were discussed.  These included infection, bleeding, abscess, retained stone and bile leak.  The need for open conversion was dicussed.  The rare possibility of a common bile duct injury and the need for additional procedures and/or surgery was reviewed.  All question were answered.  The patient has agreed to proceed.

## 2024-05-02 NOTE — SUBJECTIVE & OBJECTIVE
Current Facility-Administered Medications   Medication Dose Route Frequency Provider Last Rate Last Admin    acetaminophen tablet 650 mg  650 mg Oral Q8H PRN Sirisha Andres MD        HYDROcodone-acetaminophen 5-325 mg per tablet 1 tablet  1 tablet Oral Q4H PRN Arturo Valdez MD        HYDROcodone-acetaminophen 7.5-325 mg per tablet 1 tablet  1 tablet Oral Q6H PRN Arturo Valdez MD        lactated ringers infusion   Intravenous Continuous Arturo Valdez MD   Stopped at 05/02/24 0550    LIDOcaine (PF) 10 mg/ml (1%) injection 10 mg  1 mL Intradermal Once PRN Sirisha Andres MD        melatonin tablet 6 mg  6 mg Oral Nightly PRN Sirisha Andres MD        morphine injection 6 mg  6 mg Intravenous Q4H PRN Arturo Valdez MD        ondansetron injection 4 mg  4 mg Intravenous Q8H PRN Sirisha Andres MD   4 mg at 05/02/24 0045    piperacillin-tazobactam (ZOSYN) 4.5 g in dextrose 5 % in water (D5W) 100 mL IVPB (MB+)  4.5 g Intravenous Q8H Sirisha Andres MD 25 mL/hr at 05/02/24 0619 Rate Verify at 05/02/24 0619    sodium chloride 0.9% flush 10 mL  10 mL Intravenous PRN Sirisha Andres MD           Review of patient's allergies indicates:   Allergen Reactions    Statins-hmg-coa reductase inhibitors Other (See Comments)     Severe muscular issues    Evolocumab     Calcium Itching    Codeine Nausea And Vomiting    Fish oil Rash       Past Medical History:   Diagnosis Date    Chronic constipation 7/10/2020    Mixed hyperlipidemia 7/10/2020    Osteopenia of multiple sites 7/10/2020     Past Surgical History:   Procedure Laterality Date    APPENDECTOMY      HYSTERECTOMY      TONSILLECTOMY       Family History       Problem Relation (Age of Onset)    Diabetes Father    Hyperlipidemia Maternal Grandfather    Hypertension Father          Tobacco Use    Smoking status: Never    Smokeless tobacco: Never   Substance and Sexual Activity    Alcohol use: Yes     Comment: occasional    Drug use: Not on file    Sexual activity: Not  on file     Review of Systems   Constitutional:  Negative for appetite change, chills, fatigue, fever and unexpected weight change.   HENT:  Negative for hearing loss and rhinorrhea.    Eyes:  Negative for visual disturbance.   Respiratory:  Negative for apnea, cough, shortness of breath and wheezing.    Cardiovascular:  Negative for chest pain and palpitations.   Gastrointestinal:  Positive for abdominal pain, nausea and vomiting. Negative for abdominal distention, blood in stool, constipation and diarrhea.   Genitourinary:  Negative for dysuria, frequency and urgency.        Dark urine   Musculoskeletal:  Negative for arthralgias and neck pain.   Skin:  Negative for rash.   Neurological:  Negative for seizures, weakness, numbness and headaches.   Hematological:  Negative for adenopathy. Does not bruise/bleed easily.   Psychiatric/Behavioral:  Negative for hallucinations. The patient is not nervous/anxious.      Objective:     Vital Signs (Most Recent):  Temp: 98 °F (36.7 °C) (05/02/24 0727)  Pulse: 74 (05/02/24 0727)  Resp: 18 (05/02/24 0727)  BP: (!) 105/56 (05/02/24 0727)  SpO2: 96 % (05/02/24 0727) Vital Signs (24h Range):  Temp:  [97.4 °F (36.3 °C)-99.8 °F (37.7 °C)] 98 °F (36.7 °C)  Pulse:  [] 74  Resp:  [14-18] 18  SpO2:  [96 %-99 %] 96 %  BP: (101-123)/(44-61) 105/56     Weight: 61.2 kg (135 lb)  Body mass index is 23.91 kg/m².     Physical Exam  Constitutional:       Appearance: She is well-developed.   HENT:      Head: Normocephalic.   Eyes:      Pupils: Pupils are equal, round, and reactive to light.   Neck:      Thyroid: No thyromegaly.      Vascular: No JVD.      Trachea: No tracheal deviation.   Cardiovascular:      Rate and Rhythm: Normal rate and regular rhythm.      Heart sounds: Normal heart sounds.   Pulmonary:      Breath sounds: Normal breath sounds. No wheezing.   Abdominal:      General: Abdomen is flat. Bowel sounds are normal. There is no distension.      Palpations: Abdomen is soft.  Abdomen is not rigid. There is no mass.      Tenderness: There is no abdominal tenderness. There is no guarding or rebound.      Comments: Small supraumbilical incision.  Well-healed lower midline incision   Musculoskeletal:         General: Normal range of motion.   Lymphadenopathy:      Cervical: No cervical adenopathy.   Skin:     General: Skin is warm and dry.      Findings: No erythema or rash.   Neurological:      Mental Status: She is oriented to person, place, and time.   Psychiatric:         Mood and Affect: Mood normal.         Behavior: Behavior normal.         Thought Content: Thought content normal.         Judgment: Judgment normal.            I have reviewed all pertinent lab results within the past 24 hours.  CBC:   Recent Labs   Lab 05/02/24  0505   WBC 9.79   RBC 3.73*   HGB 11.1*   HCT 33.7*      MCV 90   MCH 29.8   MCHC 32.9     CMP:   Recent Labs   Lab 05/02/24  0505   *   CALCIUM 8.9   ALBUMIN 2.8*   PROT 5.8*   *   K 4.3   CO2 23      BUN 12   CREATININE 0.7   ALKPHOS 334*   *   *   BILITOT 1.2*       Significant Diagnostics:  I have reviewed all pertinent imaging results/findings within the past 24 hours.  U/S: I have reviewed all pertinent results/findings within the past 24 hours and my personal findings are:  Gallstones    Reading Physician Reading Date Result Priority   Barrett Lu MD  [phone] 5/1/2024 STAT     Narrative & Impression  EXAMINATION:  US ABDOMEN LIMITED     CLINICAL HISTORY:  RUQ Pain;     TECHNIQUE:  Limited ultrasound of the right upper quadrant of the abdomen (including pancreas, liver, gallbladder, common bile duct, and spleen) was performed.     COMPARISON:  Prior report     FINDINGS:  Liver: Hemangiomas previously reported on prior exam not seen likely due to limited windows with midline bowel gas.  Liver measures up to 15.2 cm.     Gallbladder: Thickened gallbladder wall measuring up to 4.3 mm.  Distended gallbladder.   Small shadowing stone in the gallbladder neck.  Positive sonographic Carrera's sign.     Biliary system: The common duct is not dilated, measuring 5 mm.  No intrahepatic ductal dilatation.     Right kidney: Normal in size and echotexture, measuring 9.1 cm.     Miscellaneous: No upper abdominal ascites.  Pancreas is obscured by bowel gas     Impression:     Hemangiomas previously reported on prior exam not seen likely due to limited windows with midline bowel gas.     Thickened gallbladder wall measuring up to 4.3 mm.  Distended gallbladder. Small shadowing stone in the gallbladder neck. Positive sonographic Carrera's sign.  No common bile duct dilatation.  A a correlate clinically for cholecystitis.        Electronically signed by:Barrett Lu  Date:                                            05/01/2024  Time:                                           20:17

## 2024-05-02 NOTE — PLAN OF CARE
Discussed poc c pt, verbalized understanding.  AAOx4  VSS (no fever noted).    Frequent rounding q2h  Mouth swabs at bedside prn, pt c/o dry mouth, educated on npo orders  Fall precautions in place, remains free of injury    Accurate I&Os  IVFs cont LR @125  No skins tears noted.    Denies pain until palpating RUQ, verbalized pain on expiratory breathing  Emesis bag at bedside, c/o nausea but no vomitting noted    Will cont poc  Chart check complete      Problem: Adult Inpatient Plan of Care  Goal: Patient-Specific Goal (Individualized)  5/2/2024 0137 by Lyssa Romero LPN  Outcome: Progressing  Flowsheets (Taken 5/2/2024 0137)  Individualized Care Needs: Emesis bag, mouth swabs and rest  Anxieties, Fears or Concerns: Managing fever, feeling dehydrated (dry mouth/ when will npo orders be d/c)  5/2/2024 0133 by Lyssa Romero LPN  Outcome: Progressing     Problem: Adult Inpatient Plan of Care  Goal: Absence of Hospital-Acquired Illness or Injury  5/2/2024 0137 by Lyssa Romero LPN  Outcome: Progressing  5/2/2024 0133 by Lyssa Romero LPN  Outcome: Progressing     Problem: Adult Inpatient Plan of Care  Goal: Optimal Comfort and Wellbeing  5/2/2024 0137 by Lyssa Romero LPN  Outcome: Progressing  5/2/2024 0133 by Lyssa Romero LPN  Outcome: Progressing

## 2024-05-02 NOTE — H&P
[facility]  General Surgery  History & Physical    Patient Name: Apple Gonsalez  MRN: [id number]  Admission Date: 5/1/2024  Attending Physician: Arturo Valdez MD   Primary Care Provider: Ken Wall MD    Patient information was obtained from patient, past medical records, and ER records.     Subjective:     Chief Complaint/Reason for Admission:  Abdominal pain, acute cholecystitis   Elevated liver function tests raising the concern for choledocholithiasis   IV antibiotics   MRCP, possible ERCP.  Cholecystectomy this admission    History of Present Illness: 68-year-old female who developed abdominal pain on Saturday.  The pain was a sharp to crampy pain across upper abdomen but mostly on the right side radiating back.  The pain was associated nausea and vomiting.  She also noticed some darkening urine.      The patient has a known history of gallstones.  She has been having attacks biliary colic that usually resolve in a few days.  She was admitted to the hospital November what she said was a gallbladder attack but she was noted to have a bowel obstruction that was treated non operatively.      She presented to the emergency room as the pain persisted for more than a few days.      She also states her stool looks dark    She has a history of an open appendectomy for perforated appendicitis.  She was also had a hysterectomy    Current Facility-Administered Medications   Medication Dose Route Frequency Provider Last Rate Last Admin    acetaminophen tablet 650 mg  650 mg Oral Q8H PRN Sirisha Andres MD        HYDROcodone-acetaminophen 5-325 mg per tablet 1 tablet  1 tablet Oral Q4H PRN Arturo Valdez MD        HYDROcodone-acetaminophen 7.5-325 mg per tablet 1 tablet  1 tablet Oral Q6H PRN Arturo Valdez MD        lactated ringers infusion   Intravenous Continuous Arturo Valdez MD   Stopped at 05/02/24 0550    LIDOcaine (PF) 10 mg/ml (1%) injection 10 mg  1 mL Intradermal Once PRN  Sirisha Andres MD        melatonin tablet 6 mg  6 mg Oral Nightly PRN Sirisha Andres MD        morphine injection 6 mg  6 mg Intravenous Q4H PRN Arturo Valdez MD        ondansetron injection 4 mg  4 mg Intravenous Q8H PRN Sirisha Andres MD   4 mg at 05/02/24 0045    piperacillin-tazobactam (ZOSYN) 4.5 g in dextrose 5 % in water (D5W) 100 mL IVPB (MB+)  4.5 g Intravenous Q8H Sirisha Andres MD 25 mL/hr at 05/02/24 0619 Rate Verify at 05/02/24 0619    sodium chloride 0.9% flush 10 mL  10 mL Intravenous PRN Sirisha Andres MD           Review of patient's allergies indicates:   Allergen Reactions    Statins-hmg-coa reductase inhibitors Other (See Comments)     Severe muscular issues    Evolocumab     Calcium Itching    Codeine Nausea And Vomiting    Fish oil Rash       Past Medical History:   Diagnosis Date    Chronic constipation 7/10/2020    Mixed hyperlipidemia 7/10/2020    Osteopenia of multiple sites 7/10/2020     Past Surgical History:   Procedure Laterality Date    APPENDECTOMY      HYSTERECTOMY      TONSILLECTOMY       Family History       Problem Relation (Age of Onset)    Diabetes Father    Hyperlipidemia Maternal Grandfather    Hypertension Father          Tobacco Use    Smoking status: Never    Smokeless tobacco: Never   Substance and Sexual Activity    Alcohol use: Yes     Comment: occasional    Drug use: Not on file    Sexual activity: Not on file     Review of Systems   Constitutional:  Negative for appetite change, chills, fatigue, fever and unexpected weight change.   HENT:  Negative for hearing loss and rhinorrhea.    Eyes:  Negative for visual disturbance.   Respiratory:  Negative for apnea, cough, shortness of breath and wheezing.    Cardiovascular:  Negative for chest pain and palpitations.   Gastrointestinal:  Positive for abdominal pain, nausea and vomiting. Negative for abdominal distention, blood in stool, constipation and diarrhea.   Genitourinary:  Negative for dysuria, frequency and  urgency.        Dark urine   Musculoskeletal:  Negative for arthralgias and neck pain.   Skin:  Negative for rash.   Neurological:  Negative for seizures, weakness, numbness and headaches.   Hematological:  Negative for adenopathy. Does not bruise/bleed easily.   Psychiatric/Behavioral:  Negative for hallucinations. The patient is not nervous/anxious.      Objective:     Vital Signs (Most Recent):  Temp: 98 °F (36.7 °C) (05/02/24 0727)  Pulse: 74 (05/02/24 0727)  Resp: 18 (05/02/24 0727)  BP: (!) 105/56 (05/02/24 0727)  SpO2: 96 % (05/02/24 0727) Vital Signs (24h Range):  Temp:  [97.4 °F (36.3 °C)-99.8 °F (37.7 °C)] 98 °F (36.7 °C)  Pulse:  [] 74  Resp:  [14-18] 18  SpO2:  [96 %-99 %] 96 %  BP: (101-123)/(44-61) 105/56     Weight: 61.2 kg (135 lb)  Body mass index is 23.91 kg/m².     Physical Exam  Constitutional:       Appearance: She is well-developed.   HENT:      Head: Normocephalic.   Eyes:      Pupils: Pupils are equal, round, and reactive to light.   Neck:      Thyroid: No thyromegaly.      Vascular: No JVD.      Trachea: No tracheal deviation.   Cardiovascular:      Rate and Rhythm: Normal rate and regular rhythm.      Heart sounds: Normal heart sounds.   Pulmonary:      Breath sounds: Normal breath sounds. No wheezing.   Abdominal:      General: Abdomen is flat. Bowel sounds are normal. There is no distension.      Palpations: Abdomen is soft. Abdomen is not rigid. There is no mass.      Tenderness: There is no abdominal tenderness. There is no guarding or rebound.      Comments: Small supraumbilical incision.  Well-healed lower midline incision   Musculoskeletal:         General: Normal range of motion.   Lymphadenopathy:      Cervical: No cervical adenopathy.   Skin:     General: Skin is warm and dry.      Findings: No erythema or rash.   Neurological:      Mental Status: She is oriented to person, place, and time.   Psychiatric:         Mood and Affect: Mood normal.         Behavior: Behavior  normal.         Thought Content: Thought content normal.         Judgment: Judgment normal.            I have reviewed all pertinent lab results within the past 24 hours.  CBC:   Recent Labs   Lab 05/02/24  0505   WBC 9.79   RBC 3.73*   HGB 11.1*   HCT 33.7*      MCV 90   MCH 29.8   MCHC 32.9     CMP:   Recent Labs   Lab 05/02/24  0505   *   CALCIUM 8.9   ALBUMIN 2.8*   PROT 5.8*   *   K 4.3   CO2 23      BUN 12   CREATININE 0.7   ALKPHOS 334*   *   *   BILITOT 1.2*       Significant Diagnostics:  I have reviewed all pertinent imaging results/findings within the past 24 hours.  U/S: I have reviewed all pertinent results/findings within the past 24 hours and my personal findings are:  Gallstones    Reading Physician Reading Date Result Priority   Barrett Lu MD  [phone] 5/1/2024 STAT     Narrative & Impression  EXAMINATION:  US ABDOMEN LIMITED     CLINICAL HISTORY:  RUQ Pain;     TECHNIQUE:  Limited ultrasound of the right upper quadrant of the abdomen (including pancreas, liver, gallbladder, common bile duct, and spleen) was performed.     COMPARISON:  Prior report     FINDINGS:  Liver: Hemangiomas previously reported on prior exam not seen likely due to limited windows with midline bowel gas.  Liver measures up to 15.2 cm.     Gallbladder: Thickened gallbladder wall measuring up to 4.3 mm.  Distended gallbladder.  Small shadowing stone in the gallbladder neck.  Positive sonographic Carrera's sign.     Biliary system: The common duct is not dilated, measuring 5 mm.  No intrahepatic ductal dilatation.     Right kidney: Normal in size and echotexture, measuring 9.1 cm.     Miscellaneous: No upper abdominal ascites.  Pancreas is obscured by bowel gas     Impression:     Hemangiomas previously reported on prior exam not seen likely due to limited windows with midline bowel gas.     Thickened gallbladder wall measuring up to 4.3 mm.  Distended gallbladder. Small shadowing  stone in the gallbladder neck. Positive sonographic Carrera's sign.  No common bile duct dilatation.  A a correlate clinically for cholecystitis.        Electronically signed by:Barrett Lu  Date:                                            05/01/2024  Time:                                           20:17    Assessment/Plan:     Elevated liver function tests  Can be reactive  Choledocholithiasis needs to be considered   MRCP, if choledocholithiasis then GI consult for ERCP    Calculus of gallbladder with acute cholecystitis and obstruction  Patient has acute cholecystitis with elevated liver function tests.      Elevated liver function tests could be reactive or related to choledocholithiasis   Placed in observation  IV antibiotics   MRCP   Possible robotic assisted cholecystectomy by 1 of my partners.      If the MRCP shows choledocholithiasis then GI consult ERCP.  It was explained that if she needs an ERCP then she would need to go to [place] or [place]    The risks, benefits and complications of robotic/laparoscopic cholecystectomy were discussed.  These included infection, bleeding, abscess, retained stone and bile leak.  The need for open conversion was dicussed.  The rare possibility of a common bile duct injury and the need for additional procedures and/or surgery was reviewed.  All question were answered.  The patient has agreed to proceed.        VTE Risk Mitigation (From admission, onward)           Ordered     Place ERIKA hose  Until discontinued         05/01/24 2341     IP VTE LOW RISK PATIENT  Once         05/01/24 2341                    Arturo Valdez MD  General Surgery  [facility]

## 2024-05-02 NOTE — ASSESSMENT & PLAN NOTE
Can be reactive  Choledocholithiasis needs to be considered   MRCP, if choledocholithiasis then GI consult for ERCP

## 2024-05-02 NOTE — PLAN OF CARE
[facility]  Discharge Assessment    Primary Care Provider: Ken Wall MD     Discharge Assessment (most recent)       BRIEF DISCHARGE ASSESSMENT - 05/02/24 1320          Discharge Planning    Assessment Type Discharge Planning Brief Assessment     Resource/Environmental Concerns none     Support Systems Children     Equipment Currently Used at Home none     Patient/Family Anticipates Transition to home     Discharge Plan A Home

## 2024-05-02 NOTE — ANESTHESIA POSTPROCEDURE EVALUATION
Anesthesia Post Evaluation    Patient: Apple King    Procedure(s) Performed: Procedure(s) (LRB):  XI ROBOTIC CHOLECYSTECTOMY (N/A)  BLOCK, TRANSVERSUS ABDOMINIS PLANE (N/A)    Final Anesthesia Type: general      Patient location during evaluation: PACU  Patient participation: Yes- Able to Participate  Level of consciousness: awake and alert  Post-procedure vital signs: reviewed and stable  Pain management: adequate  Airway patency: patent  JUDIE mitigation strategies: Verification of full reversal of neuromuscular block  PONV status at discharge: No PONV  Anesthetic complications: no      Cardiovascular status: hemodynamically stable  Respiratory status: spontaneous ventilation  Hydration status: euvolemic  Follow-up not needed.              Vitals Value Taken Time   /54 05/02/24 1544   Temp 36.4 °C (97.6 °F) 05/02/24 1544   Pulse 77 05/02/24 1544   Resp 17 05/02/24 1544   SpO2 97 % 05/02/24 1544         Event Time   Out of Recovery 05/02/2024 13:25:00         Pain/Gloria Score: Pain Rating Prior to Med Admin: 6 (5/2/2024 12:30 PM)  Pain Rating Post Med Admin: 5 (5/1/2024  7:30 PM)  Gloria Score: 9 (5/2/2024 12:30 PM)

## 2024-05-02 NOTE — ED NOTES
Upon entering pt room, pt rubbing abdomen and slightly griamacing. Pt reports pain at 7/10. Will notify provider for pain medication.

## 2024-05-02 NOTE — PLAN OF CARE
Inpatient Upgrade Note    Apple King has warranted treatment spanning two or more midnights of hospital level care for the management of  Acute cholecystitis due to biliary calculus . She continues to require IV antibiotics, IV fluids, and daily labs. Her condition is also complicated by the following comorbidities:  Acute cholecystitis due to biliary calculus .

## 2024-05-02 NOTE — ED NOTES
Current pain 5/10 to RUQ radiates to the back, sharp pain, constant achy. Last BM yesterday. Last time she ate was this morning 1200pm today. Pt instructed to remain NPO.

## 2024-05-02 NOTE — HPI
68-year-old female who developed abdominal pain on Saturday.  The pain was a sharp to crampy pain across upper abdomen but mostly on the right side radiating back.  The pain was associated nausea and vomiting.  She also noticed some darkening urine.      The patient has a known history of gallstones.  She has been having attacks biliary colic that usually resolve in a few days.  She was admitted to the hospital November what she said was a gallbladder attack but she was noted to have a bowel obstruction that was treated non operatively.      She presented to the emergency room as the pain persisted for more than a few days.      She also states her stool looks dark    She has a history of an open appendectomy for perforated appendicitis.  She was also had a hysterectomy

## 2024-05-02 NOTE — PROGRESS NOTES
Pharmacist Renal Dose Adjustment Note    Apple Gonsalez is a 68 y.o. female being treated with the medication piperacillin-tazobactam    Patient Data:    Vital Signs (Most Recent):  Temp: 98.7 °F (37.1 °C) (05/01/24 1702)  Pulse: 90 (05/01/24 2202)  Resp: 17 (05/01/24 2200)  BP: (!) 115/57 (05/01/24 2202)  SpO2: 96 % (05/01/24 2202) Vital Signs (72h Range):  Temp:  [98.7 °F (37.1 °C)]   Pulse:  []   Resp:  [16-18]   BP: (115-123)/(44-61)   SpO2:  [96 %-99 %]      Recent Labs   Lab 05/01/24  2241   CREATININE 0.7     Serum creatinine: 0.7 mg/dL 05/01/24 2241  Estimated creatinine clearance: 63.6 mL/min    Piperacillin-tazobactam 4.5 g q6h over 30 minutes will be changed to piperacillin-tazobactam 4.5 g q8h over 4 hours for CrCl >20 mL/min.    Pharmacist's Name: Scarlet Taylor  Pharmacist's Extension: [phone]

## 2024-05-03 VITALS
WEIGHT: 135 LBS | TEMPERATURE: 98 F | OXYGEN SATURATION: 98 % | SYSTOLIC BLOOD PRESSURE: 118 MMHG | DIASTOLIC BLOOD PRESSURE: 68 MMHG | HEIGHT: 63 IN | BODY MASS INDEX: 23.92 KG/M2 | HEART RATE: 79 BPM | RESPIRATION RATE: 18 BRPM

## 2024-05-03 LAB
ALBUMIN SERPL BCP-MCNC: 2.6 G/DL (ref 3.5–5.2)
ALP SERPL-CCNC: 242 U/L (ref 55–135)
ALT SERPL W/O P-5'-P-CCNC: 114 U/L (ref 10–44)
ANION GAP SERPL CALC-SCNC: 10 MMOL/L (ref 8–16)
AST SERPL-CCNC: 74 U/L (ref 10–40)
BILIRUB SERPL-MCNC: 0.4 MG/DL (ref 0.1–1)
BUN SERPL-MCNC: 17 MG/DL (ref 8–23)
CALCIUM SERPL-MCNC: 8.9 MG/DL (ref 8.7–10.5)
CHLORIDE SERPL-SCNC: 103 MMOL/L (ref 95–110)
CO2 SERPL-SCNC: 22 MMOL/L (ref 23–29)
CREAT SERPL-MCNC: 0.7 MG/DL (ref 0.5–1.4)
ERYTHROCYTE [DISTWIDTH] IN BLOOD BY AUTOMATED COUNT: 12.7 % (ref 11.5–14.5)
EST. GFR  (NO RACE VARIABLE): >60 ML/MIN/1.73 M^2
GLUCOSE SERPL-MCNC: 202 MG/DL (ref 70–110)
HCT VFR BLD AUTO: 32.1 % (ref 37–48.5)
HGB BLD-MCNC: 10.7 G/DL (ref 12–16)
MCH RBC QN AUTO: 30 PG (ref 27–31)
MCHC RBC AUTO-ENTMCNC: 33.3 G/DL (ref 32–36)
MCV RBC AUTO: 90 FL (ref 82–98)
PLATELET # BLD AUTO: 284 K/UL (ref 150–450)
PMV BLD AUTO: 9.6 FL (ref 9.2–12.9)
POTASSIUM SERPL-SCNC: 4.4 MMOL/L (ref 3.5–5.1)
PROT SERPL-MCNC: 5.5 G/DL (ref 6–8.4)
RBC # BLD AUTO: 3.57 M/UL (ref 4–5.4)
SODIUM SERPL-SCNC: 135 MMOL/L (ref 136–145)
WBC # BLD AUTO: 9.49 K/UL (ref 3.9–12.7)

## 2024-05-03 PROCEDURE — 80053 COMPREHEN METABOLIC PANEL: CPT

## 2024-05-03 PROCEDURE — 25000003 PHARM REV CODE 250

## 2024-05-03 PROCEDURE — 63600175 PHARM REV CODE 636 W HCPCS

## 2024-05-03 PROCEDURE — 36415 COLL VENOUS BLD VENIPUNCTURE: CPT

## 2024-05-03 PROCEDURE — 85027 COMPLETE CBC AUTOMATED: CPT

## 2024-05-03 PROCEDURE — 99024 POSTOP FOLLOW-UP VISIT: CPT | Mod: ,,,

## 2024-05-03 PROCEDURE — 94799 UNLISTED PULMONARY SVC/PX: CPT

## 2024-05-03 RX ORDER — CIPROFLOXACIN 500 MG/1
500 TABLET ORAL 2 TIMES DAILY
Qty: 10 TABLET | Refills: 0 | Status: SHIPPED | OUTPATIENT
Start: 2024-05-03 | End: 2024-05-08

## 2024-05-03 RX ORDER — METRONIDAZOLE 500 MG/1
500 TABLET ORAL 3 TIMES DAILY
Qty: 15 TABLET | Refills: 0 | Status: SHIPPED | OUTPATIENT
Start: 2024-05-03 | End: 2024-05-08

## 2024-05-03 RX ADMIN — PIPERACILLIN AND TAZOBACTAM 4.5 G: 4; .5 INJECTION, POWDER, LYOPHILIZED, FOR SOLUTION INTRAVENOUS; PARENTERAL at 05:05

## 2024-05-03 RX ADMIN — CHLORHEXIDINE GLUCONATE 0.12% ORAL RINSE 10 ML: 1.2 LIQUID ORAL at 08:05

## 2024-05-03 NOTE — DISCHARGE SUMMARY
[facility] S[facility]  General Surgery  Discharge Summary      Patient Name: Apple King  MRN: [id number]  Admission Date: 5/1/2024  Hospital Length of Stay: 1 days  Discharge Date and Time:  05/03/2024 7:30 AM  Attending Physician: Jordy Ramos MD   Discharging Provider: Michaelle Clayton PA-C  Primary Care Provider: Ken Wall MD    HPI:   68-year-old female who developed abdominal pain on Saturday.  The pain was a sharp to crampy pain across upper abdomen but mostly on the right side radiating back.  The pain was associated nausea and vomiting.  She also noticed some darkening urine.      The patient has a known history of gallstones.  She has been having attacks biliary colic that usually resolve in a few days.  She was admitted to the hospital November what she said was a gallbladder attack but she was noted to have a bowel obstruction that was treated non operatively.      She presented to the emergency room as the pain persisted for more than a few days.      She also states her stool looks dark    She has a history of an open appendectomy for perforated appendicitis.  She was also had a hysterectomy    Procedure(s) (LRB):  XI ROBOTIC CHOLECYSTECTOMY (N/A)  BLOCK, TRANSVERSUS ABDOMINIS PLANE (N/A)      Indwelling Lines/Drains at time of discharge:   Lines/Drains/Airways       None                 Hospital Course: 05/03/2024- POD 1 robotic cholecystectomy. Feeling much better. No further abdominal pain, only incisions soreness. Tolerating reg diet, no n/v. Ambulating. Ready for dc    Goals of Care Treatment Preferences:  Code Status: Full Code      Consults:     Significant Diagnostic Studies: Labs: CMP   Recent Labs   Lab 05/01/24  2241 05/02/24  0505 05/03/24  0411   * 135* 135*   K 3.8 4.3 4.4    102 103   CO2 23 23 22*   * 136* 202*   BUN 11 12 17   CREATININE 0.7 0.7 0.7   CALCIUM 9.1 8.9 8.9   PROT 6.6 5.8* 5.5*   ALBUMIN 2.9* 2.8* 2.6*   BILITOT 1.4* 1.2* 0.4   ALKPHOS  326* 334* 242*   * 216* 74*   ALT 99* 158* 114*   ANIONGAP 9 10 10    and CBC   Recent Labs   Lab 05/01/24  1849 05/02/24  0505 05/03/24  0411   WBC 10.57 9.79 9.49   HGB 13.5 11.1* 10.7*   HCT 40.7 33.7* 32.1*    241 284       Pending Diagnostic Studies:       Procedure Component Value Units Date/Time    Specimen to Pathology, Surgery General Surgery [[id number]] Collected: 05/02/24 1137    Order Status: Sent Lab Status: In process Updated: 05/02/24 1312    Specimen: Tissue           Final Active Diagnoses:    Diagnosis Date Noted POA    PRINCIPAL PROBLEM:  Calculus of gallbladder with acute cholecystitis and obstruction [K80.01] 05/02/2024 Yes    Elevated liver function tests [R79.89] 05/02/2024 Yes      Problems Resolved During this Admission:      Discharged Condition: good    Disposition: Home or Self Care    Follow Up:   Follow-up Information       Michaelle Clayton PA-C Follow up in 2 week(s).    Specialty: Colon and Rectal Surgery  Contact information:  [address] [place] [address]   [address] [address]  [phone]                           Patient Instructions:      Diet Adult Regular     Lifting restrictions   Order Comments: No lifting over 10 pounds for 6 weeks     No driving until:   Order Comments: No longer taking pain medications & able to safely react to drivers     Notify your health care provider if you experience any of the following:  temperature >100.4     Notify your health care provider if you experience any of the following:  persistent nausea and vomiting or diarrhea     Notify your health care provider if you experience any of the following:  severe uncontrolled pain     Notify your health care provider if you experience any of the following:  redness, tenderness, or signs of infection (pain, swelling, redness, odor or green/yellow discharge around incision site)     Notify your health care provider if you experience any of the following:  difficulty breathing or  increased cough     Notify your health care provider if you experience any of the following:  severe persistent headache     Notify your health care provider if you experience any of the following:  worsening rash     Notify your health care provider if you experience any of the following:  persistent dizziness, light-headedness, or visual disturbances     Notify your health care provider if you experience any of the following:  increased confusion or weakness     No dressing needed     Activity as tolerated   Order Comments: Take over the counter medication as needed for pain     Shower on day dressing removed (No bath)   Order Comments: Ok to shower, no submerging incisions underwater in bath for at least 2 weeks post op. Waterproof glue over incisions     Medications:  Reconciled Home Medications:      Medication List        START taking these medications      ciprofloxacin HCl 500 MG tablet  Commonly known as: CIPRO  Take 1 tablet (500 mg total) by mouth 2 (two) times daily. for 5 days     metroNIDAZOLE 500 MG tablet  Commonly known as: FLAGYL  Take 1 tablet (500 mg total) by mouth 3 (three) times daily. for 5 days            CONTINUE taking these medications      ergocalciferol 50,000 unit Cap  Commonly known as: ERGOCALCIFEROL  Take 50,000 Units by mouth every 7 days.     magnesium 30 mg Tab  Take 1 tablet by mouth once daily.            Time spent on the discharge of patient: 25 minutes    Michaelle Clayton PA-C  General Surgery  [facility]

## 2024-05-03 NOTE — HOSPITAL COURSE
05/03/2024- POD 1 robotic cholecystectomy. Feeling much better. No further abdominal pain, only incisions soreness. Tolerating reg diet, no n/v. Ambulating. Ready for dc   "I personally performed the services described in the documentation  recorded by the scribe in my presence, and it accurately and completely records my words and action.”

## 2024-05-03 NOTE — PLAN OF CARE
Problem: Adult Inpatient Plan of Care  Goal: Plan of Care Review  Outcome: Progressing  Goal: Patient-Specific Goal (Individualized)  Outcome: Progressing  Goal: Absence of Hospital-Acquired Illness or Injury  Outcome: Progressing  Goal: Optimal Comfort and Wellbeing  Outcome: Progressing  Goal: Readiness for Transition of Care  Outcome: Progressing     Problem: Nausea and Vomiting  Goal: Nausea and Vomiting Relief  Outcome: Progressing     Problem: Pain Acute  Goal: Optimal Pain Control and Function  Outcome: Progressing     Problem: Wound  Goal: Optimal Coping  Outcome: Progressing  Goal: Optimal Functional Ability  Outcome: Progressing  Goal: Absence of Infection Signs and Symptoms  Outcome: Progressing  Goal: Improved Oral Intake  Outcome: Progressing  Goal: Optimal Pain Control and Function  Outcome: Progressing  Goal: Skin Health and Integrity  Outcome: Progressing  Goal: Optimal Wound Healing  Outcome: Progressing

## 2024-05-03 NOTE — PLAN OF CARE
[facility]  Discharge Final Note    Primary Care Provider: Ken Wall MD    Expected Discharge Date: 5/3/2024    Final Discharge Note (most recent)       Final Note - 05/03/24 0855          Final Note    Assessment Type Final Discharge Note     Anticipated Discharge Disposition Home or Self Care        Post-Acute Status    Discharge Delays None known at this time                     Important Message from Medicare             Contact Info       Michaelle Clayton PA-C   Specialty: Colon and Rectal Surgery    [address] [place] [address]   [address] [address]   Phone: [phone]       Next Steps: Follow up in 2 week(s)          Discharge home, no home health or dme orders noted.

## 2024-05-08 LAB
FINAL PATHOLOGIC DIAGNOSIS: NORMAL
GROSS: NORMAL
Lab: NORMAL

## 2024-05-15 ENCOUNTER — TELEPHONE (OUTPATIENT)
Dept: SURGERY | Facility: CLINIC | Age: 69
End: 2024-05-15
Payer: MEDICARE

## 2024-05-21 ENCOUNTER — OFFICE VISIT (OUTPATIENT)
Dept: SURGERY | Facility: CLINIC | Age: 69
End: 2024-05-21
Payer: MEDICARE

## 2024-05-21 ENCOUNTER — LAB VISIT (OUTPATIENT)
Dept: LAB | Facility: HOSPITAL | Age: 69
End: 2024-05-21
Payer: MEDICARE

## 2024-05-21 VITALS
HEART RATE: 94 BPM | DIASTOLIC BLOOD PRESSURE: 88 MMHG | SYSTOLIC BLOOD PRESSURE: 144 MMHG | HEIGHT: 63 IN | WEIGHT: 134.69 LBS | BODY MASS INDEX: 23.86 KG/M2

## 2024-05-21 DIAGNOSIS — R79.89 ELEVATED LIVER FUNCTION TESTS: ICD-10-CM

## 2024-05-21 DIAGNOSIS — K80.01 CALCULUS OF GALLBLADDER WITH ACUTE CHOLECYSTITIS AND OBSTRUCTION: ICD-10-CM

## 2024-05-21 DIAGNOSIS — K80.01 CALCULUS OF GALLBLADDER WITH ACUTE CHOLECYSTITIS AND OBSTRUCTION: Primary | ICD-10-CM

## 2024-05-21 PROCEDURE — 80053 COMPREHEN METABOLIC PANEL: CPT

## 2024-05-21 PROCEDURE — 99999 PR PBB SHADOW E&M-EST. PATIENT-LVL III: CPT | Mod: PBBFAC,,,

## 2024-05-21 PROCEDURE — 36415 COLL VENOUS BLD VENIPUNCTURE: CPT

## 2024-05-21 PROCEDURE — 1126F AMNT PAIN NOTED NONE PRSNT: CPT | Mod: CPTII,S$GLB,,

## 2024-05-21 PROCEDURE — 3077F SYST BP >= 140 MM HG: CPT | Mod: CPTII,S$GLB,,

## 2024-05-21 PROCEDURE — 99024 POSTOP FOLLOW-UP VISIT: CPT | Mod: S$GLB,,,

## 2024-05-21 PROCEDURE — 3079F DIAST BP 80-89 MM HG: CPT | Mod: CPTII,S$GLB,,

## 2024-05-21 PROCEDURE — 1159F MED LIST DOCD IN RCRD: CPT | Mod: CPTII,S$GLB,,

## 2024-05-21 NOTE — PROGRESS NOTES
History & Physical    Subjective     History of Present Illness:  Patient is a 69 y.o. female presents for post op evaluation s/p robotic cholecystectomy on 05/02/24. Patient originally presented to ED with multi day, worsened abdominal pain located to RUQ. Was found to have acute cholecystitis, tbili downtrended. Was taken for surgery & found to have significantly inflamed gallbladder with evidence of chronic cholecystitis. She was placed on IV abx and discharged the following day with downtrend of tbili and transaminates. Tbili was normal at 0.4 at discharge with liver enzymes downtrended. Was discharged with po abx. Since discharge, she has done well. She is no longer having abdominal pain like she was having prior to surgery. She completed po abx. She is tolerating a regular diet and having normal bowel movements. Denies nausea, vomiting, fevers, chills. Reports colonoscopy at external facility about 1-2 years ago that was normal, told to repeat in 10 years.      Chief Complaint   Patient presents with    Post-op Evaluation     Post op       Review of patient's allergies indicates:   Allergen Reactions    Statins-hmg-coa reductase inhibitors Other (See Comments)     Severe muscular issues    Evolocumab     Calcium Itching    Codeine Nausea And Vomiting    Fish oil Rash       Current Outpatient Medications   Medication Sig Dispense Refill    ergocalciferol (ERGOCALCIFEROL) 50,000 unit Cap Take 50,000 Units by mouth every 7 days.      magnesium 30 mg Tab Take 1 tablet by mouth once daily.       No current facility-administered medications for this visit.       Past Medical History:   Diagnosis Date    Chronic constipation 7/10/2020    Mixed hyperlipidemia 7/10/2020    Osteopenia of multiple sites 7/10/2020     Past Surgical History:   Procedure Laterality Date    APPENDECTOMY      HYSTERECTOMY      INJECTION OF ANESTHETIC AGENT INTO TISSUE PLANE DEFINED BY TRANSVERSUS ABDOMINIS MUSCLE N/A 5/2/2024    Procedure:  "BLOCK, TRANSVERSUS ABDOMINIS PLANE;  Surgeon: Jordy Ramos MD;  Location: [facility] OR;  Service: General;  Laterality: N/A;    ROBOT-ASSISTED CHOLECYSTECTOMY USING DA RIRI XI N/A 5/2/2024    Procedure: XI ROBOTIC CHOLECYSTECTOMY;  Surgeon: Jordy Ramos MD;  Location: [facility] OR;  Service: General;  Laterality: N/A;    TONSILLECTOMY       Family History   Problem Relation Name Age of Onset    Diabetes Father      Hypertension Father      Hyperlipidemia Maternal Grandfather       Social History     Tobacco Use    Smoking status: Never    Smokeless tobacco: Never   Substance Use Topics    Alcohol use: Yes     Comment: occasional        Review of Systems:  Review of Systems   Constitutional:  Negative for activity change, appetite change, chills, fatigue, fever and unexpected weight change.   HENT:  Negative for congestion, ear pain, sore throat and trouble swallowing.    Eyes:  Negative for pain, redness and itching.   Respiratory:  Negative for cough, shortness of breath and wheezing.    Cardiovascular:  Negative for chest pain, palpitations and leg swelling.   Gastrointestinal:  Negative for abdominal distention, abdominal pain, anal bleeding, blood in stool, constipation, diarrhea, nausea, rectal pain and vomiting.   Endocrine: Negative for cold intolerance, heat intolerance and polyuria.   Genitourinary:  Negative for dysuria, flank pain, frequency and hematuria.   Musculoskeletal:  Negative for gait problem, joint swelling and neck pain.   Skin:  Negative for color change, rash and wound.   Allergic/Immunologic: Negative for environmental allergies and immunocompromised state.   Neurological:  Negative for dizziness, speech difficulty, weakness and numbness.   Psychiatric/Behavioral:  Negative for agitation, confusion and hallucinations.           Objective     Vital Signs (Most Recent)  Pulse: 94 (05/21/24 1339)  BP: (!) 144/88 (05/21/24 1339)  5' 3" (1.6 m)  61.1 kg (134 lb 11.2 oz)     Physical " Exam:  Physical Exam  Vitals and nursing note reviewed. Exam conducted with a chaperone present.   Constitutional:       General: She is not in acute distress.     Appearance: Normal appearance. She is well-developed and normal weight. She is not ill-appearing or toxic-appearing.   HENT:      Head: Normocephalic and atraumatic.      Right Ear: External ear normal.      Left Ear: External ear normal.      Nose: Nose normal.   Cardiovascular:      Rate and Rhythm: Normal rate.   Pulmonary:      Effort: Pulmonary effort is normal. No respiratory distress.   Abdominal:      General: Abdomen is flat. There is no distension.      Palpations: Abdomen is soft.      Tenderness: There is no abdominal tenderness.      Comments: Well healed port sites   Musculoskeletal:         General: Normal range of motion.      Cervical back: Normal range of motion and neck supple.   Skin:     General: Skin is warm and dry.   Neurological:      Mental Status: She is alert and oriented to person, place, and time.   Psychiatric:         Mood and Affect: Mood normal.         Behavior: Behavior normal.       Laboratory  CBC: Reviewed  CMP: Reviewed    Diagnostic Results:      Component 2 wk ago   Final Pathologic Diagnosis Gallbladder (cholecystectomy):  Cholelithiasis with marked acute and chronic cholecystitis and abscess  Benign adherent liver parenchyma, mild steatosis, 5%.           Assessment and Plan     69 y.o. female with acute cholecystitis s/p robotic cholecystectomy 05/02/24    - Pathology reviewed.  - No heavy lifting over 10 pounds for 6 weeks post-operatively.  - will repeat CMP to ensure normalization of transaminates   - RTC prn     Michaelle Clayton PA-C  Colon and Rectal Surgery  [facility]

## 2024-05-22 LAB
ALBUMIN SERPL BCP-MCNC: 3.6 G/DL (ref 3.5–5.2)
ALP SERPL-CCNC: 94 U/L (ref 55–135)
ALT SERPL W/O P-5'-P-CCNC: 23 U/L (ref 10–44)
ANION GAP SERPL CALC-SCNC: 9 MMOL/L (ref 8–16)
AST SERPL-CCNC: 20 U/L (ref 10–40)
BILIRUB SERPL-MCNC: 0.2 MG/DL (ref 0.1–1)
BUN SERPL-MCNC: 17 MG/DL (ref 8–23)
CALCIUM SERPL-MCNC: 9.6 MG/DL (ref 8.7–10.5)
CHLORIDE SERPL-SCNC: 104 MMOL/L (ref 95–110)
CO2 SERPL-SCNC: 24 MMOL/L (ref 23–29)
CREAT SERPL-MCNC: 0.7 MG/DL (ref 0.5–1.4)
EST. GFR  (NO RACE VARIABLE): >60 ML/MIN/1.73 M^2
GLUCOSE SERPL-MCNC: 94 MG/DL (ref 70–110)
POTASSIUM SERPL-SCNC: 3.9 MMOL/L (ref 3.5–5.1)
PROT SERPL-MCNC: 7.1 G/DL (ref 6–8.4)
SODIUM SERPL-SCNC: 137 MMOL/L (ref 136–145)

## 2024-06-13 ENCOUNTER — PATIENT MESSAGE (OUTPATIENT)
Dept: SURGERY | Facility: HOSPITAL | Age: 69
End: 2024-06-13
Payer: MEDICARE

## (undated) DEVICE — SOL NORMAL USPCA 0.9%

## (undated) DEVICE — SEAL UNIVERSAL 5MM-8MM XI

## (undated) DEVICE — DYE ICG

## (undated) DEVICE — PACK BASIC SETUP SC BR

## (undated) DEVICE — HEADREST ROUND DISP FOAM 9IN

## (undated) DEVICE — CLIP HEMO-LOK ML

## (undated) DEVICE — DRAPE THREE-QTR REINF 53X77IN

## (undated) DEVICE — IRRIGATOR ENDOWRIST XI SUCTION

## (undated) DEVICE — GLOVE SIGNATURE ESSNTL LTX 8

## (undated) DEVICE — COVER TIP CURVED SCISSORS XI

## (undated) DEVICE — ADHESIVE DERMABOND ADVANCED

## (undated) DEVICE — SUT MONOCRYL 4.0 PS2 CP496G

## (undated) DEVICE — SET PNEUMOCLEAR HEAT HUM SE HF

## (undated) DEVICE — ELECTRODE REM PLYHSV RETURN 9

## (undated) DEVICE — SUT VLOC 90 3-0 V-20 NDL 6

## (undated) DEVICE — MANIFOLD 4 PORT

## (undated) DEVICE — TOWEL OR DISP STRL BLUE 4/PK

## (undated) DEVICE — STAPLER SUREFORM SGL USE 45

## (undated) DEVICE — DRAPE ARM DAVINCI XI

## (undated) DEVICE — TIP GRASPER FENESTRATED DISP

## (undated) DEVICE — DRAPE LAPSCP CHOLE 122X102X78

## (undated) DEVICE — SYR LUER-LOCK STERILE 3ML

## (undated) DEVICE — NDL ASPIR/INJ 5 MM

## (undated) DEVICE — APPLICATOR CHLORAPREP ORN 26ML

## (undated) DEVICE — DRAPE COLUMN DAVINCI XI

## (undated) DEVICE — SOL STRL WATER INJ 1000ML BG

## (undated) DEVICE — KIT ANTIFOG W/SPONG & FLUID

## (undated) DEVICE — COVER LIGHT HANDLE 80/CA

## (undated) DEVICE — SUT CTD VICRYL 0 UND BR SUT

## (undated) DEVICE — SYR LUER LOCK STERILE 10ML

## (undated) DEVICE — CANNULA REDUCER 12-8MM

## (undated) DEVICE — GOWN POLY REINF BRTH SLV XL

## (undated) DEVICE — OBTURATOR BLADELESS 8MM XI CLR

## (undated) DEVICE — RELOAD SUREFORM 45 4.3 GRN 6R

## (undated) DEVICE — DEVICE CLOSURE DISP 14G

## (undated) DEVICE — CANNULA SEAL 12MM

## (undated) DEVICE — BAG TISSUE RETRIEVAL 5MM